# Patient Record
Sex: FEMALE | Race: BLACK OR AFRICAN AMERICAN | NOT HISPANIC OR LATINO | Employment: FULL TIME | URBAN - METROPOLITAN AREA
[De-identification: names, ages, dates, MRNs, and addresses within clinical notes are randomized per-mention and may not be internally consistent; named-entity substitution may affect disease eponyms.]

---

## 2020-07-10 ENCOUNTER — HOSPITAL ENCOUNTER (INPATIENT)
Facility: HOSPITAL | Age: 55
LOS: 6 days | Discharge: NURSING, REHAB OR LONG-TERM CARE | DRG: 871 | End: 2020-07-16
Attending: EMERGENCY MEDICINE | Admitting: INTERNAL MEDICINE
Payer: COMMERCIAL

## 2020-07-10 DIAGNOSIS — J96.01 ACUTE RESPIRATORY FAILURE WITH HYPOXIA: ICD-10-CM

## 2020-07-10 DIAGNOSIS — R09.02 HYPOXIA: ICD-10-CM

## 2020-07-10 DIAGNOSIS — U07.1 COVID-19 VIRUS INFECTION: Primary | ICD-10-CM

## 2020-07-10 DIAGNOSIS — R06.02 SOB (SHORTNESS OF BREATH): ICD-10-CM

## 2020-07-10 DIAGNOSIS — R07.9 CHEST PAIN: ICD-10-CM

## 2020-07-10 PROBLEM — R79.89 ELEVATED TROPONIN: Status: ACTIVE | Noted: 2020-07-10

## 2020-07-10 PROBLEM — E66.01 MORBID OBESITY: Status: ACTIVE | Noted: 2020-07-10

## 2020-07-10 PROBLEM — I10 HTN (HYPERTENSION): Status: ACTIVE | Noted: 2020-07-10

## 2020-07-10 PROBLEM — A41.9 SEPSIS: Status: ACTIVE | Noted: 2020-07-10

## 2020-07-10 PROBLEM — N17.9 AKI (ACUTE KIDNEY INJURY): Status: ACTIVE | Noted: 2020-07-10

## 2020-07-10 PROBLEM — K21.9 GERD (GASTROESOPHAGEAL REFLUX DISEASE): Status: ACTIVE | Noted: 2020-07-10

## 2020-07-10 PROBLEM — E03.9 HYPOTHYROIDISM: Status: ACTIVE | Noted: 2020-07-10

## 2020-07-10 PROBLEM — J18.9 BILATERAL PNEUMONIA: Status: ACTIVE | Noted: 2020-07-10

## 2020-07-10 LAB
ALBUMIN SERPL BCP-MCNC: 3.9 G/DL (ref 3.5–5.2)
ALLENS TEST: ABNORMAL
ALP SERPL-CCNC: 89 U/L (ref 55–135)
ALT SERPL W/O P-5'-P-CCNC: 44 U/L (ref 10–44)
ANION GAP SERPL CALC-SCNC: 14 MMOL/L (ref 8–16)
AST SERPL-CCNC: 43 U/L (ref 10–40)
BACTERIA #/AREA URNS HPF: ABNORMAL /HPF
BASOPHILS # BLD AUTO: 0.01 K/UL (ref 0–0.2)
BASOPHILS # BLD AUTO: 0.01 K/UL (ref 0–0.2)
BASOPHILS NFR BLD: 0.2 % (ref 0–1.9)
BASOPHILS NFR BLD: 0.3 % (ref 0–1.9)
BILIRUB SERPL-MCNC: 0.4 MG/DL (ref 0.1–1)
BILIRUB UR QL STRIP: ABNORMAL
BNP SERPL-MCNC: <10 PG/ML (ref 0–99)
BUN SERPL-MCNC: 24 MG/DL (ref 6–20)
CALCIUM SERPL-MCNC: 9.1 MG/DL (ref 8.7–10.5)
CHLORIDE SERPL-SCNC: 104 MMOL/L (ref 95–110)
CK SERPL-CCNC: 638 U/L (ref 20–180)
CLARITY UR: CLEAR
CO2 SERPL-SCNC: 19 MMOL/L (ref 23–29)
COLOR UR: YELLOW
CREAT SERPL-MCNC: 2.2 MG/DL (ref 0.5–1.4)
D DIMER PPP IA.FEU-MCNC: 1.35 MG/L FEU
DELSYS: ABNORMAL
DIFFERENTIAL METHOD: ABNORMAL
DIFFERENTIAL METHOD: ABNORMAL
EOSINOPHIL # BLD AUTO: 0 K/UL (ref 0–0.5)
EOSINOPHIL # BLD AUTO: 0 K/UL (ref 0–0.5)
EOSINOPHIL NFR BLD: 0 % (ref 0–8)
EOSINOPHIL NFR BLD: 0 % (ref 0–8)
ERYTHROCYTE [DISTWIDTH] IN BLOOD BY AUTOMATED COUNT: 14.2 % (ref 11.5–14.5)
ERYTHROCYTE [DISTWIDTH] IN BLOOD BY AUTOMATED COUNT: 14.4 % (ref 11.5–14.5)
EST. GFR  (AFRICAN AMERICAN): 28 ML/MIN/1.73 M^2
EST. GFR  (NON AFRICAN AMERICAN): 24 ML/MIN/1.73 M^2
FIO2: 21
GLUCOSE SERPL-MCNC: 109 MG/DL (ref 70–110)
GLUCOSE UR QL STRIP: NEGATIVE
HCO3 UR-SCNC: 14.6 MMOL/L (ref 24–28)
HCT VFR BLD AUTO: 45.6 % (ref 37–48.5)
HCT VFR BLD AUTO: 46.2 % (ref 37–48.5)
HGB BLD-MCNC: 14.9 G/DL (ref 12–16)
HGB BLD-MCNC: 15.1 G/DL (ref 12–16)
HGB UR QL STRIP: ABNORMAL
HYALINE CASTS #/AREA URNS LPF: 0 /LPF
IMM GRANULOCYTES # BLD AUTO: 0.01 K/UL (ref 0–0.04)
IMM GRANULOCYTES # BLD AUTO: 0.01 K/UL (ref 0–0.04)
IMM GRANULOCYTES NFR BLD AUTO: 0.2 % (ref 0–0.5)
IMM GRANULOCYTES NFR BLD AUTO: 0.3 % (ref 0–0.5)
KETONES UR QL STRIP: ABNORMAL
LACTATE SERPL-SCNC: 1.8 MMOL/L (ref 0.5–2.2)
LEUKOCYTE ESTERASE UR QL STRIP: NEGATIVE
LYMPHOCYTES # BLD AUTO: 1 K/UL (ref 1–4.8)
LYMPHOCYTES # BLD AUTO: 1.5 K/UL (ref 1–4.8)
LYMPHOCYTES NFR BLD: 27.6 % (ref 18–48)
LYMPHOCYTES NFR BLD: 31.9 % (ref 18–48)
MCH RBC QN AUTO: 27.3 PG (ref 27–31)
MCH RBC QN AUTO: 27.4 PG (ref 27–31)
MCHC RBC AUTO-ENTMCNC: 32.7 G/DL (ref 32–36)
MCHC RBC AUTO-ENTMCNC: 32.7 G/DL (ref 32–36)
MCV RBC AUTO: 83 FL (ref 82–98)
MCV RBC AUTO: 84 FL (ref 82–98)
MICROSCOPIC COMMENT: ABNORMAL
MODE: ABNORMAL
MONOCYTES # BLD AUTO: 0.2 K/UL (ref 0.3–1)
MONOCYTES # BLD AUTO: 0.3 K/UL (ref 0.3–1)
MONOCYTES NFR BLD: 6.1 % (ref 4–15)
MONOCYTES NFR BLD: 6.7 % (ref 4–15)
NEUTROPHILS # BLD AUTO: 2.3 K/UL (ref 1.8–7.7)
NEUTROPHILS # BLD AUTO: 2.8 K/UL (ref 1.8–7.7)
NEUTROPHILS NFR BLD: 61.6 % (ref 38–73)
NEUTROPHILS NFR BLD: 65.1 % (ref 38–73)
NITRITE UR QL STRIP: NEGATIVE
NRBC BLD-RTO: 0 /100 WBC
NRBC BLD-RTO: 0 /100 WBC
PCO2 BLDA: 21.2 MMHG (ref 35–45)
PH SMN: 7.45 [PH] (ref 7.35–7.45)
PH UR STRIP: 6 [PH] (ref 5–8)
PLATELET # BLD AUTO: 194 K/UL (ref 150–350)
PLATELET # BLD AUTO: 213 K/UL (ref 150–350)
PMV BLD AUTO: 11.6 FL (ref 9.2–12.9)
PMV BLD AUTO: 12.1 FL (ref 9.2–12.9)
PO2 BLDA: 58 MMHG (ref 80–100)
POC BE: -9 MMOL/L
POC SATURATED O2: 92 % (ref 95–100)
POTASSIUM SERPL-SCNC: 3.6 MMOL/L (ref 3.5–5.1)
PROT SERPL-MCNC: 7.6 G/DL (ref 6–8.4)
PROT UR QL STRIP: ABNORMAL
RBC # BLD AUTO: 5.44 M/UL (ref 4–5.4)
RBC # BLD AUTO: 5.54 M/UL (ref 4–5.4)
RBC #/AREA URNS HPF: 0 /HPF (ref 0–4)
SAMPLE: ABNORMAL
SARS-COV-2 RDRP RESP QL NAA+PROBE: NEGATIVE
SITE: ABNORMAL
SODIUM SERPL-SCNC: 137 MMOL/L (ref 136–145)
SP GR UR STRIP: 1.02 (ref 1–1.03)
SQUAMOUS #/AREA URNS HPF: 3 /HPF
TROPONIN I SERPL DL<=0.01 NG/ML-MCNC: 0.03 NG/ML (ref 0–0.03)
TROPONIN I SERPL DL<=0.01 NG/ML-MCNC: 0.03 NG/ML (ref 0–0.03)
URN SPEC COLLECT METH UR: ABNORMAL
UROBILINOGEN UR STRIP-ACNC: NEGATIVE EU/DL
WBC # BLD AUTO: 3.59 K/UL (ref 3.9–12.7)
WBC # BLD AUTO: 4.58 K/UL (ref 3.9–12.7)
WBC #/AREA URNS HPF: 3 /HPF (ref 0–5)

## 2020-07-10 PROCEDURE — 83605 ASSAY OF LACTIC ACID: CPT

## 2020-07-10 PROCEDURE — U0002 COVID-19 LAB TEST NON-CDC: HCPCS

## 2020-07-10 PROCEDURE — 85379 FIBRIN DEGRADATION QUANT: CPT | Mod: ER

## 2020-07-10 PROCEDURE — 93010 ELECTROCARDIOGRAM REPORT: CPT | Mod: 76,,, | Performed by: INTERNAL MEDICINE

## 2020-07-10 PROCEDURE — 27000221 HC OXYGEN, UP TO 24 HOURS

## 2020-07-10 PROCEDURE — 63600175 PHARM REV CODE 636 W HCPCS: Performed by: NURSE PRACTITIONER

## 2020-07-10 PROCEDURE — 84484 ASSAY OF TROPONIN QUANT: CPT

## 2020-07-10 PROCEDURE — 36600 WITHDRAWAL OF ARTERIAL BLOOD: CPT

## 2020-07-10 PROCEDURE — 82803 BLOOD GASES ANY COMBINATION: CPT

## 2020-07-10 PROCEDURE — 99291 CRITICAL CARE FIRST HOUR: CPT | Mod: 25

## 2020-07-10 PROCEDURE — 94640 AIRWAY INHALATION TREATMENT: CPT

## 2020-07-10 PROCEDURE — 25000003 PHARM REV CODE 250: Performed by: NURSE PRACTITIONER

## 2020-07-10 PROCEDURE — 99900035 HC TECH TIME PER 15 MIN (STAT)

## 2020-07-10 PROCEDURE — 83880 ASSAY OF NATRIURETIC PEPTIDE: CPT

## 2020-07-10 PROCEDURE — 93010 ELECTROCARDIOGRAM REPORT: CPT | Mod: ,,, | Performed by: INTERNAL MEDICINE

## 2020-07-10 PROCEDURE — 96375 TX/PRO/DX INJ NEW DRUG ADDON: CPT

## 2020-07-10 PROCEDURE — 81000 URINALYSIS NONAUTO W/SCOPE: CPT

## 2020-07-10 PROCEDURE — 36415 COLL VENOUS BLD VENIPUNCTURE: CPT

## 2020-07-10 PROCEDURE — 93010 EKG 12-LEAD: ICD-10-PCS | Mod: 76,,, | Performed by: INTERNAL MEDICINE

## 2020-07-10 PROCEDURE — 94761 N-INVAS EAR/PLS OXIMETRY MLT: CPT

## 2020-07-10 PROCEDURE — 85025 COMPLETE CBC W/AUTO DIFF WBC: CPT | Mod: 91

## 2020-07-10 PROCEDURE — 63600175 PHARM REV CODE 636 W HCPCS: Performed by: EMERGENCY MEDICINE

## 2020-07-10 PROCEDURE — 82550 ASSAY OF CK (CPK): CPT

## 2020-07-10 PROCEDURE — 93005 ELECTROCARDIOGRAM TRACING: CPT

## 2020-07-10 PROCEDURE — 96365 THER/PROPH/DIAG IV INF INIT: CPT

## 2020-07-10 PROCEDURE — 21400001 HC TELEMETRY ROOM

## 2020-07-10 PROCEDURE — 84484 ASSAY OF TROPONIN QUANT: CPT | Mod: 91

## 2020-07-10 PROCEDURE — 25000003 PHARM REV CODE 250: Performed by: EMERGENCY MEDICINE

## 2020-07-10 PROCEDURE — 80053 COMPREHEN METABOLIC PANEL: CPT

## 2020-07-10 PROCEDURE — 87040 BLOOD CULTURE FOR BACTERIA: CPT | Mod: 59

## 2020-07-10 PROCEDURE — 25000242 PHARM REV CODE 250 ALT 637 W/ HCPCS: Performed by: NURSE PRACTITIONER

## 2020-07-10 RX ORDER — IBUPROFEN 200 MG
24 TABLET ORAL
Status: DISCONTINUED | OUTPATIENT
Start: 2020-07-10 | End: 2020-07-16 | Stop reason: HOSPADM

## 2020-07-10 RX ORDER — PANTOPRAZOLE SODIUM 40 MG/1
40 TABLET, DELAYED RELEASE ORAL DAILY
Status: DISCONTINUED | OUTPATIENT
Start: 2020-07-11 | End: 2020-07-14

## 2020-07-10 RX ORDER — ACETAMINOPHEN 325 MG/1
650 TABLET ORAL
Status: COMPLETED | OUTPATIENT
Start: 2020-07-10 | End: 2020-07-10

## 2020-07-10 RX ORDER — LEVOTHYROXINE SODIUM 75 UG/1
TABLET ORAL
COMMUNITY
Start: 2020-04-06

## 2020-07-10 RX ORDER — ALBUTEROL SULFATE 90 UG/1
2 AEROSOL, METERED RESPIRATORY (INHALATION) EVERY 8 HOURS
Status: DISCONTINUED | OUTPATIENT
Start: 2020-07-10 | End: 2020-07-10

## 2020-07-10 RX ORDER — ALBUTEROL SULFATE 90 UG/1
2 AEROSOL, METERED RESPIRATORY (INHALATION) EVERY 8 HOURS
Status: DISCONTINUED | OUTPATIENT
Start: 2020-07-10 | End: 2020-07-16 | Stop reason: HOSPADM

## 2020-07-10 RX ORDER — LEVOTHYROXINE SODIUM 75 UG/1
75 TABLET ORAL
Status: DISCONTINUED | OUTPATIENT
Start: 2020-07-11 | End: 2020-07-16 | Stop reason: HOSPADM

## 2020-07-10 RX ORDER — SODIUM CHLORIDE 0.9 % (FLUSH) 0.9 %
10 SYRINGE (ML) INJECTION
Status: DISCONTINUED | OUTPATIENT
Start: 2020-07-10 | End: 2020-07-16 | Stop reason: HOSPADM

## 2020-07-10 RX ORDER — GLUCAGON 1 MG
1 KIT INJECTION
Status: DISCONTINUED | OUTPATIENT
Start: 2020-07-10 | End: 2020-07-16 | Stop reason: HOSPADM

## 2020-07-10 RX ORDER — HEPARIN SODIUM 5000 [USP'U]/ML
5000 INJECTION, SOLUTION INTRAVENOUS; SUBCUTANEOUS EVERY 8 HOURS
Status: DISCONTINUED | OUTPATIENT
Start: 2020-07-10 | End: 2020-07-14

## 2020-07-10 RX ORDER — PROMETHAZINE HYDROCHLORIDE AND CODEINE PHOSPHATE 6.25; 1 MG/5ML; MG/5ML
5 SOLUTION ORAL EVERY 4 HOURS PRN
Status: DISCONTINUED | OUTPATIENT
Start: 2020-07-10 | End: 2020-07-16 | Stop reason: HOSPADM

## 2020-07-10 RX ORDER — PANTOPRAZOLE SODIUM 40 MG/1
40 TABLET, DELAYED RELEASE ORAL DAILY
COMMUNITY

## 2020-07-10 RX ORDER — NITROGLYCERIN 0.4 MG/1
0.4 TABLET SUBLINGUAL EVERY 5 MIN PRN
Status: DISCONTINUED | OUTPATIENT
Start: 2020-07-10 | End: 2020-07-10

## 2020-07-10 RX ORDER — ASCORBIC ACID 500 MG
TABLET ORAL
COMMUNITY

## 2020-07-10 RX ORDER — POTASSIUM CHLORIDE 20 MEQ/1
TABLET, EXTENDED RELEASE ORAL
COMMUNITY
Start: 2020-05-28

## 2020-07-10 RX ORDER — ACETAMINOPHEN 325 MG/1
650 TABLET ORAL EVERY 4 HOURS PRN
Status: DISCONTINUED | OUTPATIENT
Start: 2020-07-10 | End: 2020-07-16 | Stop reason: HOSPADM

## 2020-07-10 RX ORDER — ASPIRIN 81 MG/1
81 TABLET ORAL DAILY
Status: DISCONTINUED | OUTPATIENT
Start: 2020-07-11 | End: 2020-07-14

## 2020-07-10 RX ORDER — ONDANSETRON 2 MG/ML
4 INJECTION INTRAMUSCULAR; INTRAVENOUS
Status: COMPLETED | OUTPATIENT
Start: 2020-07-10 | End: 2020-07-10

## 2020-07-10 RX ORDER — TRIAMTERENE AND HYDROCHLOROTHIAZIDE 37.5; 25 MG/1; MG/1
CAPSULE ORAL
Status: ON HOLD | COMMUNITY
End: 2020-07-16 | Stop reason: HOSPADM

## 2020-07-10 RX ORDER — ASCORBIC ACID 1000 MG
TABLET, EXTENDED RELEASE ORAL
Status: ON HOLD | COMMUNITY
End: 2020-07-16 | Stop reason: HOSPADM

## 2020-07-10 RX ORDER — SODIUM CHLORIDE 9 MG/ML
INJECTION, SOLUTION INTRAVENOUS CONTINUOUS
Status: DISCONTINUED | OUTPATIENT
Start: 2020-07-10 | End: 2020-07-16 | Stop reason: HOSPADM

## 2020-07-10 RX ORDER — IBUPROFEN 200 MG
16 TABLET ORAL
Status: DISCONTINUED | OUTPATIENT
Start: 2020-07-10 | End: 2020-07-16 | Stop reason: HOSPADM

## 2020-07-10 RX ORDER — AZITHROMYCIN 250 MG/1
250 TABLET, FILM COATED ORAL DAILY
Status: COMPLETED | OUTPATIENT
Start: 2020-07-11 | End: 2020-07-14

## 2020-07-10 RX ORDER — GLUCOSAM/CHONDRO/HERB 149/HYAL 750-100 MG
TABLET ORAL
Status: ON HOLD | COMMUNITY
End: 2020-07-16 | Stop reason: HOSPADM

## 2020-07-10 RX ORDER — BENZONATATE 100 MG/1
100 CAPSULE ORAL 3 TIMES DAILY PRN
Status: DISCONTINUED | OUTPATIENT
Start: 2020-07-10 | End: 2020-07-16 | Stop reason: HOSPADM

## 2020-07-10 RX ORDER — BIOTIN 10 MG
TABLET ORAL
COMMUNITY

## 2020-07-10 RX ORDER — ONDANSETRON 2 MG/ML
4 INJECTION INTRAMUSCULAR; INTRAVENOUS EVERY 6 HOURS PRN
Status: DISCONTINUED | OUTPATIENT
Start: 2020-07-10 | End: 2020-07-16 | Stop reason: HOSPADM

## 2020-07-10 RX ORDER — ASPIRIN 81 MG/1
81 TABLET ORAL DAILY
COMMUNITY

## 2020-07-10 RX ADMIN — AZITHROMYCIN MONOHYDRATE 500 MG: 500 INJECTION, POWDER, LYOPHILIZED, FOR SOLUTION INTRAVENOUS at 09:07

## 2020-07-10 RX ADMIN — HEPARIN SODIUM 5000 UNITS: 5000 INJECTION, SOLUTION INTRAVENOUS; SUBCUTANEOUS at 10:07

## 2020-07-10 RX ADMIN — VANCOMYCIN HYDROCHLORIDE 2000 MG: 100 INJECTION, POWDER, LYOPHILIZED, FOR SOLUTION INTRAVENOUS at 12:07

## 2020-07-10 RX ADMIN — PIPERACILLIN AND TAZOBACTAM 4.5 G: 4; .5 INJECTION, POWDER, LYOPHILIZED, FOR SOLUTION INTRAVENOUS; PARENTERAL at 11:07

## 2020-07-10 RX ADMIN — ALBUTEROL SULFATE 2 PUFF: 90 AEROSOL, METERED RESPIRATORY (INHALATION) at 07:07

## 2020-07-10 RX ADMIN — CEFTRIAXONE 1 G: 1 INJECTION, SOLUTION INTRAVENOUS at 08:07

## 2020-07-10 RX ADMIN — SODIUM CHLORIDE: 0.9 INJECTION, SOLUTION INTRAVENOUS at 04:07

## 2020-07-10 RX ADMIN — PROMETHAZINE HYDROCHLORIDE AND CODEINE PHOSPHATE 5 ML: 6.25; 1 SOLUTION ORAL at 05:07

## 2020-07-10 RX ADMIN — ONDANSETRON HYDROCHLORIDE 4 MG: 2 INJECTION, SOLUTION INTRAMUSCULAR; INTRAVENOUS at 10:07

## 2020-07-10 RX ADMIN — ACETAMINOPHEN 650 MG: 325 TABLET ORAL at 08:07

## 2020-07-10 RX ADMIN — ACETAMINOPHEN 650 MG: 325 TABLET ORAL at 10:07

## 2020-07-10 RX ADMIN — ONDANSETRON 4 MG: 2 INJECTION INTRAMUSCULAR; INTRAVENOUS at 11:07

## 2020-07-10 RX ADMIN — DEXAMETHASONE 6 MG: 4 TABLET ORAL at 04:07

## 2020-07-10 NOTE — ASSESSMENT & PLAN NOTE
- secondary to COVID-19 viral infection in addition to bilateral pneumonia  - ABG showed Po2 of 58 on room air  - treat pneumonia  - continue supplemental oxygen to KS >92%  - Will need home O2 eval prior to DC  - monitor

## 2020-07-10 NOTE — ASSESSMENT & PLAN NOTE
- BC drawn and are pending  - will attempt to obtain sputum culture  - Given Vanc and Zosyn in the ER, will transition to Azithromycin and Rocephin per protocol  - Albuterol inhaler PRN  - Supplemental O2 to KS >92  - Monitor

## 2020-07-10 NOTE — ED NOTES
Pt reports generalized weakness, nausea, body aches, chills and dry cough. Pt AAOx4, respirations labored after coughing spell, SPO2 99% on 3L/min, placed pt in upright position, will continue to monitor.

## 2020-07-10 NOTE — HPI
Barbara Weir is a 55 y.o. AAF with a PMHx of Morbid obesity, Anemia, and HTN, who presented to the Emergency Department today for evaluation of worsening SOB, which onset a few days ago.  Symptoms are constant and moderate in severity.  Of note, patient reportedly tested positive for COVID-19 on 7/7/20 (test obtained at [facility]), but repeat rapid test today noted to be negative.  Patient's  also tested positive and was admitted today.  No mitigating or exacerbating factors reported.  Associated sxs include SOB, myalgias, cough, nausea, subjective fever, chills, and fatigue. Patient denies any CP, vomiting, diarrhea, dysuria, HA, and all other sxs at this time.  No prior Tx reported.  No further complaints or concerns at this time.  ER workup showed; tachycardia and tachypnea.  SaO2 on nasal cannula 93%.  CBC showed WBCs of 3.59, otherwise unremarkable.  CMP showed creatinine 2.2, CO2 19, AST 43, otherwise unremarkable.  BNP less than 10.  .  Lactic acid 1.8.  ABG showed; ph 7.447, Pco2 21.2, HCO3 14.6, Po2 58 on RA.  CXR showed a moderate amount of alveolar consolidation scattered throughout the lower 2/3 of both lungs.  This is consistent with the patient's history and characteristic of pneumonia.  The size of the heart is prominent.  Some of this may be secondary to magnification.  BC were drawn and patient was given Vanc and Zosyn in the ER.  Hospital Medicine contacted for admission.  Patient will be placed in observation.  She is a full code.  Her SDM is her daughter Charley who can be reached at [phone].

## 2020-07-10 NOTE — ED TRIAGE NOTES
Arrives to ER complaining of SOB, generalized body aches and pain, reports testing positive for coivd, continuous cough, chest pain when coughing, nausea -v/d also complaining of left shoulder pain. Currently 93% on RA

## 2020-07-10 NOTE — ASSESSMENT & PLAN NOTE
- BP well controlled  - Hold home losartan and HCTZ given ASHLEY  - Resume home Amlodipine pending blood pressure trends

## 2020-07-10 NOTE — ASSESSMENT & PLAN NOTE
- TSH pending  - continue home Synthroid and adjust dose if need be  - outpatient follow-up with PCP

## 2020-07-10 NOTE — ASSESSMENT & PLAN NOTE
- Tachycardic and tachypneic  - Lactic acid 1.8  - Procalcitonin pending  - Likely secondary to COVID-19 viral infection in addition to bilateral pneumonia  - BC drawn and are pending  - Will hold off on IV fluid bolus for now as patient is hemodynamically stable  - Continue IV ABX  - Monitor

## 2020-07-10 NOTE — H&P
[facility] Hospital Medicine  History & Physical    Patient Name: Barbara Weir  MRN: [id number]  Admission Date: 7/10/2020  Attending Physician: No att. providers found   Primary Care Provider: Daniella Samson MD         Patient information was obtained from patient, relative(s) and ER records.     Subjective:     Principal Problem:COVID-19 virus infection    Chief Complaint:   Chief Complaint   Patient presents with    Shortness of Breath     COVID positive        HPI: Barbara Weir is a 55 y.o. AAF with a PMHx of Morbid obesity, Anemia, and HTN, who presented to the Emergency Department today for evaluation of worsening SOB, which onset a few days ago.  Symptoms are constant and moderate in severity.  Of note, patient reportedly tested positive for COVID-19 on 7/7/20 (test obtained at [facility]), but repeat rapid test today noted to be negative.  Patient's  also tested positive and was admitted today.  No mitigating or exacerbating factors reported.  Associated sxs include SOB, myalgias, cough, nausea, subjective fever, chills, and fatigue. Patient denies any CP, vomiting, diarrhea, dysuria, HA, and all other sxs at this time.  No prior Tx reported.  No further complaints or concerns at this time.  ER workup showed; tachycardia and tachypnea.  SaO2 on nasal cannula 93%.  CBC showed WBCs of 3.59, otherwise unremarkable.  CMP showed creatinine 2.2, CO2 19, AST 43, otherwise unremarkable.  BNP less than 10.  .  Lactic acid 1.8.  ABG showed; ph 7.447, Pco2 21.2, HCO3 14.6, Po2 58 on RA.  CXR showed a moderate amount of alveolar consolidation scattered throughout the lower 2/3 of both lungs.  This is consistent with the patient's history and characteristic of pneumonia.  The size of the heart is prominent.  Some of this may be secondary to magnification.  BC were drawn and patient was given Vanc and Zosyn in the ER.  Hospital Medicine contacted for admission.  Patient will be  placed in observation.  She is a full code.  Her SDM is her daughter Charley who can be reached at [phone].    Past Medical History:   Diagnosis Date    Anemia     Hypertension        Past Surgical History:   Procedure Laterality Date    CHOLECYSTECTOMY         Review of patient's allergies indicates:  No Known Allergies    No current facility-administered medications on file prior to encounter.      Current Outpatient Medications on File Prior to Encounter   Medication Sig    amlodipine (NORVASC) 5 MG tablet Take 5 mg by mouth once daily.    aspirin (ECOTRIN) 81 MG EC tablet Take 81 mg by mouth once daily.    biotin 10 mg Tab 1 tablet    Ca carb-Ca gluc-Mg ox-Mg gluco (CALCIUM MAGNESIUM) 500 mg calcium -250 mg Tab 1 tablet with a meal    ferrous gluconate (FERGON) 240 (27 FE) MG tablet Take 480 mg by mouth 3 (three) times daily.    levothyroxine (SYNTHROID) 75 MCG tablet     losartan (COZAAR) 100 MG tablet Take 100 mg by mouth once daily.    melatonin 300 mcg Tab 1 tablet at bedtime as needed with food    naproxen (NAPROSYN) 375 MG tablet Take 1 tablet (375 mg total) by mouth 2 (two) times daily with meals.    omega 3-dha-epa-fish oil 1,000 mg (120 mg-180 mg) Cap 1 capsule    pantoprazole (PROTONIX) 40 MG tablet Take 40 mg by mouth once daily.     potassium chloride SA (K-DUR,KLOR-CON) 20 MEQ tablet     triamterene-hydrochlorothiazide 37.5-25 mg (DYAZIDE) 37.5-25 mg per capsule TAKE 1 CAPSULE EVERY MORNING    [DISCONTINUED] furosemide (LASIX) 20 MG tablet Take 20 mg by mouth 2 (two) times daily.     Family History     None        Tobacco Use    Smoking status: Never Smoker   Substance and Sexual Activity    Alcohol use: No    Drug use: No    Sexual activity: Not on file     Review of Systems   Constitutional: Positive for chills, fatigue and fever. Negative for diaphoresis.   HENT: Negative for facial swelling, hearing loss, mouth sores, sneezing, sore throat, tinnitus and trouble  swallowing.    Eyes: Negative for photophobia, pain, discharge, redness and visual disturbance.   Respiratory: Positive for cough and shortness of breath. Negative for apnea, choking, chest tightness, wheezing and stridor.         C/o dry, non-productive cough.   Cardiovascular: Negative for chest pain, palpitations and leg swelling.   Gastrointestinal: Positive for diarrhea and nausea. Negative for abdominal distention, abdominal pain, anal bleeding, blood in stool, constipation, rectal pain and vomiting.   Endocrine: Negative for cold intolerance, heat intolerance, polydipsia, polyphagia and polyuria.   Genitourinary: Negative for difficulty urinating, dysuria, flank pain, frequency, hematuria, pelvic pain, urgency, vaginal bleeding, vaginal discharge and vaginal pain.   Musculoskeletal: Positive for myalgias. Negative for arthralgias, back pain, gait problem and neck stiffness.   Skin: Negative for pallor, rash and wound.   Allergic/Immunologic: Negative for food allergies.   Neurological: Negative for dizziness, tremors, seizures, syncope, speech difficulty, weakness and headaches.   Hematological: Negative for adenopathy. Does not bruise/bleed easily.   Psychiatric/Behavioral: Negative for behavioral problems and confusion. The patient is not nervous/anxious.    All other systems reviewed and are negative.    Objective:     Vital Signs (Most Recent):  Temp: 99.8 °F (37.7 °C) (07/10/20 0944)  Pulse: 108 (07/10/20 1550)  Resp: (!) 27 (07/10/20 1550)  BP: 128/62 (07/10/20 1550)  SpO2: 96 % (07/10/20 1550) Vital Signs (24h Range):  Temp:  [99.8 °F (37.7 °C)] 99.8 °F (37.7 °C)  Pulse:  [] 108  Resp:  [10-33] 27  SpO2:  [93 %-98 %] 96 %  BP: (102-128)/(59-73) 128/62        There is no height or weight on file to calculate BMI.    Physical Exam  Vitals signs and nursing note reviewed.   Constitutional:       Appearance: She is well-developed. She is obese. She is ill-appearing.      Comments: Morbidly obese, AAF  resting comfortably in bed in no acute distress.  Daughter Charley at bedside.   HENT:      Head: Normocephalic and atraumatic.      Nose: Nose normal.   Eyes:      Conjunctiva/sclera: Conjunctivae normal.      Pupils: Pupils are equal, round, and reactive to light.   Neck:      Musculoskeletal: Normal range of motion and neck supple.   Cardiovascular:      Rate and Rhythm: Regular rhythm. Tachycardia present.      Heart sounds: Normal heart sounds. No murmur. No friction rub. No gallop.    Pulmonary:      Effort: Pulmonary effort is normal. Tachypnea present. No accessory muscle usage or respiratory distress.      Breath sounds: Normal breath sounds. Decreased air movement present. No wheezing or rales.      Comments: Coarse throughout.  Mild tachypnea.  Comfortable on 3 L nasal cannula and in no overt respiratory distress at this time. Frequent, dry, non-productive cough.  Chest:      Chest wall: No tenderness.   Abdominal:      General: Bowel sounds are normal. There is no distension.      Palpations: Abdomen is soft. There is no mass.      Tenderness: There is no abdominal tenderness.   Musculoskeletal: Normal range of motion.         General: No tenderness.   Skin:     General: Skin is warm and dry.      Findings: No erythema or rash.   Neurological:      Mental Status: She is alert and oriented to person, place, and time.   Psychiatric:         Behavior: Behavior normal.         Thought Content: Thought content normal.         Judgment: Judgment normal.           CRANIAL NERVES     CN III, IV, VI   Pupils are equal, round, and reactive to light.       Significant Labs:   ABGs:   Recent Labs   Lab 07/10/20  0931   PH 7.447   PCO2 21.2*   HCO3 14.6*   POCSATURATED 92*   BE -9     CBC:   Recent Labs   Lab 07/10/20  0937 07/10/20  1545   WBC 3.59* 4.58   HGB 15.1 14.9   HCT 46.2 45.6    213     CMP:   Recent Labs   Lab 07/10/20  0937      K 3.6      CO2 19*      BUN 24*   CREATININE 2.2*    CALCIUM 9.1   PROT 7.6   ALBUMIN 3.9   BILITOT 0.4   ALKPHOS 89   AST 43*   ALT 44   ANIONGAP 14   EGFRNONAA 24*     Lactic Acid:   Recent Labs   Lab 07/10/20  1110   LACTATE 1.8     Troponin:   Recent Labs   Lab 07/10/20  0937   TROPONINI 0.031*     Urine Studies:   Recent Labs   Lab 07/10/20  1318   COLORU Yellow   APPEARANCEUA Clear   PHUR 6.0   SPECGRAV 1.025   PROTEINUA 1+*   GLUCUA Negative   KETONESU Trace*   BILIRUBINUA 1+*   OCCULTUA Trace*   NITRITE Negative   UROBILINOGEN Negative   LEUKOCYTESUR Negative   RBCUA 0   WBCUA 3   BACTERIA Few*   SQUAMEPITHEL 3   HYALINECASTS 0       Significant Imaging: I have reviewed all pertinent imaging results/findings within the past 24 hours.    Assessment/Plan:     * COVID-19 virus infection   - COVID-19 testing performed on 7/7 at [facility] and reported as POSITIVE; rapid today is negative, however, clinically, patient presents with Covid  - Infection Control notified     - Placed in OBS  - Remdesivir fact she given to patient to review, and at this time she is refusing treatment.  Will revisit if patient changes her mind, or if she becomes more symptomatic.  - Will start on Dexamethasone 6 mg po daily  - BC drawn and are pending  - continue empiric antibiotics  - Inflammatory markers, including D dimer pending      - Isolation:   - Airborne, Contact and Droplet Precautions  - Cohort patients into COVID units  - N95 mask, wear eye protection  - 20 second hand hygiene              - Limit visitors per hospital policy              - Consolidating lab draws, nursing care, provider visits, and interventions    - Diagnostics: (leukopenia, hyponatremia, hyperferritinemia, elevated troponin, elevated d-dimer, age, and comorbidities are significant predictors of poor clinical outcome)  CBC, CMP, Procalcitonin, Ferritin, CRP, LDH, Troponin, ECG, Portable CXR and UA and culture    - Management:  Supplemental O2 to maintain SpO2 >92%  Telemetry  Continuous/intermittent Pulse  Ox  Albuterol treatment PRN    Advance Care Planning   Full Code             Bilateral pneumonia  - BC drawn and are pending  - will attempt to obtain sputum culture  - Given Vanc and Zosyn in the ER, will transition to Azithromycin and Rocephin per protocol  - Albuterol inhaler PRN  - Supplemental O2 to KS >92  - Monitor       Acute hypoxemic respiratory failure  - secondary to COVID-19 viral infection in addition to bilateral pneumonia  - ABG showed Po2 of 58 on room air  - treat pneumonia  - continue supplemental oxygen to [place] >92%  - Will need home O2 eval prior to DC  - monitor      Sepsis  - Tachycardic and tachypneic  - Lactic acid 1.8  - Procalcitonin pending  - Likely secondary to COVID-19 viral infection in addition to bilateral pneumonia  - BC drawn and are pending  - Will hold off on IV fluid bolus for now as patient is hemodynamically stable  - Continue IV ABX  - Monitor      ASHLEY (acute kidney injury)  - Cr elevated to 2.2 (last known Cr was 1.1 in 2004)  - Hold home losartan and HCTZ for now  - Gentle IVFs  - Caution with nephrotoxic medications  - Monitor with BMP      Elevated troponin  - 12 lead EKG showed sinus tachycardia with occasional Premature ventricular complexes  - Currently denies chest pain and/or equivalent  - Initial troponin 0.031, will trend  - Tele monitoring      GERD (gastroesophageal reflux disease)  - Continue Protonix      Hypothyroidism  - TSH pending  - continue home Synthroid and adjust dose if need be  - outpatient follow-up with PCP      Morbid obesity  -  on self-directed weight loss and need for increased physical activity      HTN (hypertension)  - BP well controlled  - Hold home losartan and HCTZ given ASHLEY  - Resume home Amlodipine pending blood pressure trends      VTE Risk Mitigation (From admission, onward)         Ordered     heparin (porcine) injection 5,000 Units  Every 8 hours      07/10/20 1453     IP VTE HIGH RISK PATIENT  Once      07/10/20 1453      Place sequential compression device  Until discontinued      07/10/20 7999                   Lety Shell, JOE, ACNP-BC  Department of Hospital Medicine   [facility]

## 2020-07-10 NOTE — ASSESSMENT & PLAN NOTE
- Cr elevated to 2.2 (last known Cr was 1.1 in 2004)  - Hold home losartan and HCTZ for now  - Gentle IVFs  - Caution with nephrotoxic medications  - Monitor with BMP

## 2020-07-10 NOTE — ASSESSMENT & PLAN NOTE
- 12 lead EKG showed sinus tachycardia with occasional Premature ventricular complexes  - Currently denies chest pain and/or equivalent  - Initial troponin 0.031, will trend  - Tele monitoring

## 2020-07-10 NOTE — ED PROVIDER NOTES
SCRIBE #1 NOTE: I, Beatrice Flores, am scribing for, and in the presence of, Ab Trinidad MD. I have scribed the entire note.       History     Chief Complaint   Patient presents with    Shortness of Breath     COVID positive     Review of patient's allergies indicates:  No Known Allergies      History of Present Illness     HPI    7/10/2020, 9:05 AM  History obtained from the patient      History of Present Illness: Barbara Weir is a 55 y.o. female patient with a h/o anemia, HTN, who presents to the Emergency Department for evaluation of SOB which onset a few days ago. Symptoms are constant and moderate in severity. Pt tested positive for COVID-19 on 7/7/20. Pt's  also tested positive. No mitigating or exacerbating factors reported. Associated sxs include SOB, myalgias, cough, and fatigue. Patient denies any CP, n/v/d, dysuria, HA, and all other sxs at this time. No prior Tx reported. No further complaints or concerns at this time.       Arrival mode: EMS    PCP: Daniella Samson MD      Past Medical History:  Past Medical History:   Diagnosis Date    Anemia     Hypertension        Past Surgical History:  Past Surgical History:   Procedure Laterality Date    CHOLECYSTECTOMY           Family History:  History reviewed. No pertinent family history.      Social History:   Social History     Tobacco Use    Smoking status: Never Smoker   Substance and Sexual Activity    Alcohol use: No    Drug use: No    Sexual activity: Unknown        Review of Systems     Review of Systems   Constitutional: Positive for fatigue. Negative for fever.   HENT: Negative for sore throat.    Respiratory: Positive for cough and shortness of breath.    Cardiovascular: Negative for chest pain.   Gastrointestinal: Negative for diarrhea, nausea and vomiting.   Genitourinary: Negative for dysuria.   Musculoskeletal: Positive for myalgias. Negative for back pain.   Skin: Negative for rash.   Neurological: Negative  for headaches.   Hematological: Does not bruise/bleed easily.   All other systems reviewed and are negative.       Physical Exam     Initial Vitals   BP Pulse Resp Temp SpO2   07/10/20 0918 07/10/20 0908 07/10/20 0908 07/10/20 0944 07/10/20 0908   111/67 (!) 130 20 99.8 °F (37.7 °C) 97 %      MAP       --                 Physical Exam  Nursing Notes and Vital Signs Reviewed.  Constitutional: Well-developed and well-nourished.   Head: Atraumatic. Normocephalic.  Eyes: EOM intact. No scleral icterus.  ENT: Mucous membranes are moist. Oropharynx is clear and symmetric.    Neck: Supple. Full ROM. No lymphadenopathy.  Cardiovascular: Tachycardic. Regular rhythm. No murmurs, rubs, or gallops. Distal pulses are 2+ and symmetric.  Pulmonary/Chest: Mild respiratory distress. No wheezing or rales.  Abdominal: Soft and non-distended.  There is no tenderness.  No rebound, guarding, or rigidity. Good bowel sounds.  Genitourinary: No CVA tenderness  Musculoskeletal: Moves all extremities. No obvious deformities. No calf tenderness.  Skin: Warm and dry.  Neurological:  Alert, awake, and appropriate.  Normal speech.  No acute focal neurological deficits are appreciated.  Psychiatric: Normal affect. Good eye contact. Appropriate in content.     ED Course   Critical Care    Date/Time: 7/10/2020 10:55 AM  Performed by: Ab Trinidad MD  Authorized by: Ab Trinidad MD   Direct patient critical care time: 19 minutes  Additional history critical care time: 7 minutes  Ordering / reviewing critical care time: 18 minutes  Documentation critical care time: 16 minutes  Total critical care time (exclusive of procedural time) : 60 minutes  Critical care time was exclusive of separately billable procedures and treating other patients and teaching time.  Critical care was necessary to treat or prevent imminent or life-threatening deterioration of the following conditions: COVID-19 infection, hypoxia, acute respiratory failure with  hypoxia.  Critical care was time spent personally by me on the following activities: blood draw for specimens, development of treatment plan with patient or surrogate, interpretation of cardiac output measurements, evaluation of patient's response to treatment, examination of patient, obtaining history from patient or surrogate, ordering and performing treatments and interventions, ordering and review of laboratory studies, ordering and review of radiographic studies, pulse oximetry, re-evaluation of patient's condition and review of old charts.        ED Vital Signs:  Vitals:    07/10/20 0908 07/10/20 0918 07/10/20 0920 07/10/20 0934   BP:  111/67  113/73   Pulse: (!) 130 107 104 104   Resp: 20 10  (!) 25   Temp:       TempSrc:       SpO2: 97% 95%  97%    07/10/20 0944 07/10/20 1002   BP:  103/71   Pulse:  104   Resp:  (!) 33   Temp: 99.8 °F (37.7 °C)    TempSrc: Oral    SpO2:  98%       Abnormal Lab Results:  Labs Reviewed   CBC W/ AUTO DIFFERENTIAL - Abnormal; Notable for the following components:       Result Value    WBC 3.59 (*)     RBC 5.54 (*)     Mono # 0.2 (*)     All other components within normal limits   COMPREHENSIVE METABOLIC PANEL - Abnormal; Notable for the following components:    CO2 19 (*)     BUN, Bld 24 (*)     Creatinine 2.2 (*)     AST 43 (*)     eGFR if  28 (*)     eGFR if non  24 (*)     All other components within normal limits   CK - Abnormal; Notable for the following components:     (*)     All other components within normal limits   TROPONIN I - Abnormal; Notable for the following components:    Troponin I 0.031 (*)     All other components within normal limits   ISTAT PROCEDURE - Abnormal; Notable for the following components:    POC PCO2 21.2 (*)     POC PO2 58 (*)     POC HCO3 14.6 (*)     POC SATURATED O2 92 (*)     All other components within normal limits   CULTURE, BLOOD   CULTURE, BLOOD   B-TYPE NATRIURETIC PEPTIDE   URINALYSIS, REFLEX TO  URINE CULTURE   LACTIC ACID, PLASMA        All Lab Results:  Results for orders placed or performed during the hospital encounter of 07/10/20   CBC auto differential   Result Value Ref Range    WBC 3.59 (L) 3.90 - 12.70 K/uL    RBC 5.54 (H) 4.00 - 5.40 M/uL    Hemoglobin 15.1 12.0 - 16.0 g/dL    Hematocrit 46.2 37.0 - 48.5 %    Mean Corpuscular Volume 83 82 - 98 fL    Mean Corpuscular Hemoglobin 27.3 27.0 - 31.0 pg    Mean Corpuscular Hemoglobin Conc 32.7 32.0 - 36.0 g/dL    RDW 14.2 11.5 - 14.5 %    Platelets 194 150 - 350 K/uL    MPV 11.6 9.2 - 12.9 fL    Immature Granulocytes 0.3 0.0 - 0.5 %    Gran # (ANC) 2.3 1.8 - 7.7 K/uL    Immature Grans (Abs) 0.01 0.00 - 0.04 K/uL    Lymph # 1.0 1.0 - 4.8 K/uL    Mono # 0.2 (L) 0.3 - 1.0 K/uL    Eos # 0.0 0.0 - 0.5 K/uL    Baso # 0.01 0.00 - 0.20 K/uL    nRBC 0 0 /100 WBC    Gran% 65.1 38.0 - 73.0 %    Lymph% 27.6 18.0 - 48.0 %    Mono% 6.7 4.0 - 15.0 %    Eosinophil% 0.0 0.0 - 8.0 %    Basophil% 0.3 0.0 - 1.9 %    Differential Method Automated    Comprehensive metabolic panel   Result Value Ref Range    Sodium 137 136 - 145 mmol/L    Potassium 3.6 3.5 - 5.1 mmol/L    Chloride 104 95 - 110 mmol/L    CO2 19 (L) 23 - 29 mmol/L    Glucose 109 70 - 110 mg/dL    BUN, Bld 24 (H) 6 - 20 mg/dL    Creatinine 2.2 (H) 0.5 - 1.4 mg/dL    Calcium 9.1 8.7 - 10.5 mg/dL    Total Protein 7.6 6.0 - 8.4 g/dL    Albumin 3.9 3.5 - 5.2 g/dL    Total Bilirubin 0.4 0.1 - 1.0 mg/dL    Alkaline Phosphatase 89 55 - 135 U/L    AST 43 (H) 10 - 40 U/L    ALT 44 10 - 44 U/L    Anion Gap 14 8 - 16 mmol/L    eGFR if African American 28 (A) >60 mL/min/1.73 m^2    eGFR if non African American 24 (A) >60 mL/min/1.73 m^2   Brain Natriuretic Peptide   Result Value Ref Range    BNP <10 0 - 99 pg/mL   CK   Result Value Ref Range     (H) 20 - 180 U/L   Troponin I   Result Value Ref Range    Troponin I 0.031 (H) 0.000 - 0.026 ng/mL   ISTAT PROCEDURE   Result Value Ref Range    POC PH 7.447 7.35 - 7.45     POC PCO2 21.2 (LL) 35 - 45 mmHg    POC PO2 58 (LL) 80 - 100 mmHg    POC HCO3 14.6 (L) 24 - 28 mmol/L    POC BE -9 -2 to 2 mmol/L    POC SATURATED O2 92 (L) 95 - 100 %    Sample ARTERIAL     Site RR     Allens Test Pass     DelSys Room Air     Mode SPONT     FiO2 21          Imaging Results          X-Ray Chest AP Portable (Final result)  Result time 07/10/20 10:31:47    Final result by VENKATA Schulte Sr., MD (07/10/20 10:31:47)                 Impression:      1. There is a moderate amount of alveolar consolidation scattered throughout the lower 2/3 of both lungs.  This is consistent with the patient's history and characteristic of pneumonia.  2. The size of the heart is prominent.  Some of this may be secondary to magnification.  .      Electronically signed by: Larry Schulte MD  Date:    07/10/2020  Time:    10:31             Narrative:    EXAMINATION:  XR CHEST AP PORTABLE    CLINICAL HISTORY:  sob;    COMPARISON:  None    FINDINGS:  The size of the heart is prominent.  There is a moderate amount of alveolar consolidation scattered throughout the lower 2/3 of both lungs.  There is no pneumothorax.  The costophrenic angles are sharp.                                 The EKG was ordered, reviewed, and independently interpreted by the ED provider.  Interpretation time: 9:21  Rate: 104 BPM  Rhythm: Sinus tachycardia with occasional premature ventricular complexes  Interpretation: Left axis deviation. No STEMI.      The Emergency Provider reviewed the vital signs and test results, which are outlined above.     ED Discussion       10:54 AM: Discussed case with Lety Shell NP (Hospital Medicine). Dr. Regan agrees with current care and management of pt and accepts admission.   Admitting Service: Hospital Medicine  Admit to: obs tele    Re-evaluated pt. I have discussed test results, shared treatment plan, and the need for admission with patient and family at bedside. Pt and family express understanding at this  time and agree with all information. All questions answered. Pt and family have no further questions or concerns at this time. Pt is ready for admit.       MDM        Medical Decision Making:   Clinical Tests:   Lab Tests: Ordered and Reviewed  Radiological Study: Ordered and Reviewed  Medical Tests: Ordered and Reviewed           ED Medication(s):  Medications   vancomycin - pharmacy to dose (has no administration in time range)   piperacillin-tazobactam 4.5 g in dextrose 5 % 100 mL IVPB (ready to mix system) (has no administration in time range)   vancomycin 2 g in dextrose 5 % 500 mL IVPB (has no administration in time range)   ondansetron injection 4 mg (has no administration in time range)   acetaminophen tablet 650 mg (650 mg Oral Given 7/10/20 1018)       New Prescriptions    No medications on file               Scribe Attestation:   Scribe #1: I performed the above scribed service and the documentation accurately describes the services I performed. I attest to the accuracy of the note.     Attending:   Physician Attestation Statement for Scribe #1: I, Ab Trinidad MD, personally performed the services described in this documentation, as scribed by Beatrice Flores, in my presence, and it is both accurate and complete.           Clinical Impression       ICD-10-CM ICD-9-CM   1. COVID-19 virus infection  U07.1    2. SOB (shortness of breath)  R06.02 786.05   3. Hypoxia  R09.02 799.02   4. Acute respiratory failure with hypoxia  J96.01 518.81       Disposition:   Disposition: Placed in Observation  Condition: Fair         Ab Trinidad MD  07/10/20 1107

## 2020-07-10 NOTE — ASSESSMENT & PLAN NOTE
- COVID-19 testing performed on 7/7 at [facility] and reported as POSITIVE; rapid today is negative, however, clinically, patient presents with Covid  - Infection Control notified     - Placed in OBS  - Remdesivir fact she given to patient to review, and at this time she is refusing treatment.  Will revisit if patient changes her mind, or if she becomes more symptomatic.  - Will start on Dexamethasone 6 mg po daily  - BC drawn and are pending  - continue empiric antibiotics  - Inflammatory markers, including D dimer pending      - Isolation:   - Airborne, Contact and Droplet Precautions  - Cohort patients into COVID units  - N95 mask, wear eye protection  - 20 second hand hygiene              - Limit visitors per hospital policy              - Consolidating lab draws, nursing care, provider visits, and interventions    - Diagnostics: (leukopenia, hyponatremia, hyperferritinemia, elevated troponin, elevated d-dimer, age, and comorbidities are significant predictors of poor clinical outcome)  CBC, CMP, Procalcitonin, Ferritin, CRP, LDH, Troponin, ECG, Portable CXR and UA and culture    - Management:  Supplemental O2 to maintain SpO2 >92%  Telemetry  Continuous/intermittent Pulse Ox  Albuterol treatment PRN    Advance Care Planning   Full Code

## 2020-07-10 NOTE — ED NOTES
Pt up to bedside commode with assist. SPO2 maintained 95% on 3L/min nasal cannula, with labored breathing. Pt back in bed, side rails up x2, bed low and locked, pt in upright position, RR 24. Will continue to monitor.   Z line at 47cm

## 2020-07-10 NOTE — SUBJECTIVE & OBJECTIVE
Past Medical History:   Diagnosis Date    Anemia     Hypertension        Past Surgical History:   Procedure Laterality Date    CHOLECYSTECTOMY         Review of patient's allergies indicates:  No Known Allergies    No current facility-administered medications on file prior to encounter.      Current Outpatient Medications on File Prior to Encounter   Medication Sig    amlodipine (NORVASC) 5 MG tablet Take 5 mg by mouth once daily.    aspirin (ECOTRIN) 81 MG EC tablet Take 81 mg by mouth once daily.    biotin 10 mg Tab 1 tablet    Ca carb-Ca gluc-Mg ox-Mg gluco (CALCIUM MAGNESIUM) 500 mg calcium -250 mg Tab 1 tablet with a meal    ferrous gluconate (FERGON) 240 (27 FE) MG tablet Take 480 mg by mouth 3 (three) times daily.    levothyroxine (SYNTHROID) 75 MCG tablet     losartan (COZAAR) 100 MG tablet Take 100 mg by mouth once daily.    melatonin 300 mcg Tab 1 tablet at bedtime as needed with food    naproxen (NAPROSYN) 375 MG tablet Take 1 tablet (375 mg total) by mouth 2 (two) times daily with meals.    omega 3-dha-epa-fish oil 1,000 mg (120 mg-180 mg) Cap 1 capsule    pantoprazole (PROTONIX) 40 MG tablet Take 40 mg by mouth once daily.     potassium chloride SA (K-DUR,KLOR-CON) 20 MEQ tablet     triamterene-hydrochlorothiazide 37.5-25 mg (DYAZIDE) 37.5-25 mg per capsule TAKE 1 CAPSULE EVERY MORNING    [DISCONTINUED] furosemide (LASIX) 20 MG tablet Take 20 mg by mouth 2 (two) times daily.     Family History     None        Tobacco Use    Smoking status: Never Smoker   Substance and Sexual Activity    Alcohol use: No    Drug use: No    Sexual activity: Not on file     Review of Systems   Constitutional: Positive for chills, fatigue and fever. Negative for diaphoresis.   HENT: Negative for facial swelling, hearing loss, mouth sores, sneezing, sore throat, tinnitus and trouble swallowing.    Eyes: Negative for photophobia, pain, discharge, redness and visual disturbance.   Respiratory: Positive for  cough and shortness of breath. Negative for apnea, choking, chest tightness, wheezing and stridor.         C/o dry, non-productive cough.   Cardiovascular: Negative for chest pain, palpitations and leg swelling.   Gastrointestinal: Positive for diarrhea and nausea. Negative for abdominal distention, abdominal pain, anal bleeding, blood in stool, constipation, rectal pain and vomiting.   Endocrine: Negative for cold intolerance, heat intolerance, polydipsia, polyphagia and polyuria.   Genitourinary: Negative for difficulty urinating, dysuria, flank pain, frequency, hematuria, pelvic pain, urgency, vaginal bleeding, vaginal discharge and vaginal pain.   Musculoskeletal: Positive for myalgias. Negative for arthralgias, back pain, gait problem and neck stiffness.   Skin: Negative for pallor, rash and wound.   Allergic/Immunologic: Negative for food allergies.   Neurological: Negative for dizziness, tremors, seizures, syncope, speech difficulty, weakness and headaches.   Hematological: Negative for adenopathy. Does not bruise/bleed easily.   Psychiatric/Behavioral: Negative for behavioral problems and confusion. The patient is not nervous/anxious.    All other systems reviewed and are negative.    Objective:     Vital Signs (Most Recent):  Temp: 99.8 °F (37.7 °C) (07/10/20 0944)  Pulse: 108 (07/10/20 1550)  Resp: (!) 27 (07/10/20 1550)  BP: 128/62 (07/10/20 1550)  SpO2: 96 % (07/10/20 1550) Vital Signs (24h Range):  Temp:  [99.8 °F (37.7 °C)] 99.8 °F (37.7 °C)  Pulse:  [] 108  Resp:  [10-33] 27  SpO2:  [93 %-98 %] 96 %  BP: (102-128)/(59-73) 128/62        There is no height or weight on file to calculate BMI.    Physical Exam  Vitals signs and nursing note reviewed.   Constitutional:       Appearance: She is well-developed. She is obese. She is ill-appearing.      Comments: Morbidly obese, AAF resting comfortably in bed in no acute distress.  Daughter Charley at bedside.   HENT:      Head: Normocephalic and  atraumatic.      Nose: Nose normal.   Eyes:      Conjunctiva/sclera: Conjunctivae normal.      Pupils: Pupils are equal, round, and reactive to light.   Neck:      Musculoskeletal: Normal range of motion and neck supple.   Cardiovascular:      Rate and Rhythm: Regular rhythm. Tachycardia present.      Heart sounds: Normal heart sounds. No murmur. No friction rub. No gallop.    Pulmonary:      Effort: Pulmonary effort is normal. Tachypnea present. No accessory muscle usage or respiratory distress.      Breath sounds: Normal breath sounds. Decreased air movement present. No wheezing or rales.      Comments: Coarse throughout.  Mild tachypnea.  Comfortable on 3 L nasal cannula and in no overt respiratory distress at this time. Frequent, dry, non-productive cough.  Chest:      Chest wall: No tenderness.   Abdominal:      General: Bowel sounds are normal. There is no distension.      Palpations: Abdomen is soft. There is no mass.      Tenderness: There is no abdominal tenderness.   Musculoskeletal: Normal range of motion.         General: No tenderness.   Skin:     General: Skin is warm and dry.      Findings: No erythema or rash.   Neurological:      Mental Status: She is alert and oriented to person, place, and time.   Psychiatric:         Behavior: Behavior normal.         Thought Content: Thought content normal.         Judgment: Judgment normal.           CRANIAL NERVES     CN III, IV, VI   Pupils are equal, round, and reactive to light.       Significant Labs:   ABGs:   Recent Labs   Lab 07/10/20  0931   PH 7.447   PCO2 21.2*   HCO3 14.6*   POCSATURATED 92*   BE -9     CBC:   Recent Labs   Lab 07/10/20  0937 07/10/20  1545   WBC 3.59* 4.58   HGB 15.1 14.9   HCT 46.2 45.6    213     CMP:   Recent Labs   Lab 07/10/20  0937      K 3.6      CO2 19*      BUN 24*   CREATININE 2.2*   CALCIUM 9.1   PROT 7.6   ALBUMIN 3.9   BILITOT 0.4   ALKPHOS 89   AST 43*   ALT 44   ANIONGAP 14   EGFRNONAA 24*      Lactic Acid:   Recent Labs   Lab 07/10/20  1110   LACTATE 1.8     Troponin:   Recent Labs   Lab 07/10/20  0937   TROPONINI 0.031*     Urine Studies:   Recent Labs   Lab 07/10/20  1318   COLORU Yellow   APPEARANCEUA Clear   PHUR 6.0   SPECGRAV 1.025   PROTEINUA 1+*   GLUCUA Negative   KETONESU Trace*   BILIRUBINUA 1+*   OCCULTUA Trace*   NITRITE Negative   UROBILINOGEN Negative   LEUKOCYTESUR Negative   RBCUA 0   WBCUA 3   BACTERIA Few*   SQUAMEPITHEL 3   HYALINECASTS 0       Significant Imaging: I have reviewed all pertinent imaging results/findings within the past 24 hours.

## 2020-07-11 LAB
POCT GLUCOSE: 141 MG/DL (ref 70–110)
TROPONIN I SERPL DL<=0.01 NG/ML-MCNC: 0.02 NG/ML (ref 0–0.03)

## 2020-07-11 PROCEDURE — 21400001 HC TELEMETRY ROOM

## 2020-07-11 PROCEDURE — 25000003 PHARM REV CODE 250: Performed by: NURSE PRACTITIONER

## 2020-07-11 PROCEDURE — 94640 AIRWAY INHALATION TREATMENT: CPT

## 2020-07-11 PROCEDURE — 84484 ASSAY OF TROPONIN QUANT: CPT

## 2020-07-11 PROCEDURE — 84100 ASSAY OF PHOSPHORUS: CPT

## 2020-07-11 PROCEDURE — 63700000 PHARM REV CODE 250 ALT 637 W/O HCPCS: Performed by: NURSE PRACTITIONER

## 2020-07-11 PROCEDURE — 94761 N-INVAS EAR/PLS OXIMETRY MLT: CPT

## 2020-07-11 PROCEDURE — 27000221 HC OXYGEN, UP TO 24 HOURS

## 2020-07-11 PROCEDURE — 86140 C-REACTIVE PROTEIN: CPT

## 2020-07-11 PROCEDURE — 36415 COLL VENOUS BLD VENIPUNCTURE: CPT

## 2020-07-11 PROCEDURE — 80053 COMPREHEN METABOLIC PANEL: CPT

## 2020-07-11 PROCEDURE — 83735 ASSAY OF MAGNESIUM: CPT

## 2020-07-11 PROCEDURE — 63600175 PHARM REV CODE 636 W HCPCS: Performed by: NURSE PRACTITIONER

## 2020-07-11 RX ADMIN — LEVOTHYROXINE SODIUM 75 MCG: 75 TABLET ORAL at 05:07

## 2020-07-11 RX ADMIN — ALBUTEROL SULFATE 2 PUFF: 90 AEROSOL, METERED RESPIRATORY (INHALATION) at 04:07

## 2020-07-11 RX ADMIN — ASPIRIN 81 MG: 81 TABLET, COATED ORAL at 08:07

## 2020-07-11 RX ADMIN — HEPARIN SODIUM 5000 UNITS: 5000 INJECTION, SOLUTION INTRAVENOUS; SUBCUTANEOUS at 02:07

## 2020-07-11 RX ADMIN — ALBUTEROL SULFATE 2 PUFF: 90 AEROSOL, METERED RESPIRATORY (INHALATION) at 08:07

## 2020-07-11 RX ADMIN — AZITHROMYCIN MONOHYDRATE 250 MG: 250 TABLET ORAL at 08:07

## 2020-07-11 RX ADMIN — HEPARIN SODIUM 5000 UNITS: 5000 INJECTION, SOLUTION INTRAVENOUS; SUBCUTANEOUS at 09:07

## 2020-07-11 RX ADMIN — HEPARIN SODIUM 5000 UNITS: 5000 INJECTION, SOLUTION INTRAVENOUS; SUBCUTANEOUS at 05:07

## 2020-07-11 RX ADMIN — SODIUM CHLORIDE 250 ML: 0.9 INJECTION, SOLUTION INTRAVENOUS at 12:07

## 2020-07-11 RX ADMIN — PANTOPRAZOLE SODIUM 40 MG: 40 TABLET, DELAYED RELEASE ORAL at 08:07

## 2020-07-11 RX ADMIN — CEFTRIAXONE 1 G: 1 INJECTION, SOLUTION INTRAVENOUS at 09:07

## 2020-07-11 RX ADMIN — DEXAMETHASONE 6 MG: 4 TABLET ORAL at 08:07

## 2020-07-11 NOTE — NURSING
Patient information sheet regarding Remdesivir provided to patient. Patient verbalizes consent to be treated with Remdesivir. Dr. Velez informed.

## 2020-07-11 NOTE — PROGRESS NOTES
RT called to room. Pt's O2 sat 91% on NC 4lpm. Increased to 6 lpm. Pt's O2 sat increased to 92%. Pt complained of chest pain and arms hurting. Reported to RN. EKG done and reported to Geremias RIOS.

## 2020-07-11 NOTE — PLAN OF CARE
Patient continues to be monitored and treated. Patient is on 5.5 L via nasal cannula. Ambulated patient in the room. Educated patient and patient's family member on plan of care. Patient and patient's family member verbalize understanding and agreement with plan of care. Continuing to monitor lab values, especially renal function. Normal saline continues to infuse at 75ml/hr. Family visiting at bedside today. Safety precautions in place and education provided. Will continue to monitor and treat.

## 2020-07-11 NOTE — NURSING
O2 sats 88% on 4L per NC. Called respiratory for help with O2 increase, maintaining sats. No wheezing on auscultation. Rhonchi throughout. Will monitor closely.

## 2020-07-11 NOTE — NURSING
Called to patient room, patient c/o nausea, sudden, sharp myalgias in bilateral arms. Vital signs obtained. Manuela Phillips NP notified of patient's change in status.

## 2020-07-11 NOTE — PLAN OF CARE
"Patient admitted to tele room 240 this shift from ED via stretcher.  Plan of care reviewed with patient, pt verbalized understanding.  Pt remains free from falls this shift, fall precautions in place.bed alarm set.  Pt remains free from skin breakdown, pt educated on the importance of frequent weight shift to decrease the risk of pressure injury. Pt verbalized understanding.  AAOx4,NAD noted at this time.  BP (!) 100/59 (BP Location: Right arm, Patient Position: Lying)   Pulse 81   Temp 98.7 °F (37.1 °C) (Oral)   Resp 20   Ht 5' 5" (1.651 m)   Wt 119.3 kg (263 lb 0.1 oz)   SpO2 (!) 92%   Breastfeeding No   BMI 43.77 kg/m²    PIV 20 G to R AC , Saline locked  Pt remained afebrile.  2L O2 via NC  Sinus tachy on the tele monitor Box #[id number]  pain controlled by tylenol.  NS @ 75mL/hr.  Pt admitted for COVID. 250ml bolus given for hypotension this shift. No complaints at this time.  Pt currently resting comfortably in bed.  Hourly rounding complete. Bed in lowest position, side rails up, call light in reach.  Pts  currently admitted to ICU  Will continue to monitor.    Problem: Fall Injury Risk  Goal: Absence of Fall and Fall-Related Injury  Outcome: Ongoing, Progressing     Problem: Adult Inpatient Plan of Care  Goal: Plan of Care Review  Outcome: Ongoing, Progressing  Goal: Patient-Specific Goal (Individualization)  Outcome: Ongoing, Progressing  Goal: Absence of Hospital-Acquired Illness or Injury  Outcome: Ongoing, Progressing  Goal: Optimal Comfort and Wellbeing  Outcome: Ongoing, Progressing  Goal: Readiness for Transition of Care  Outcome: Ongoing, Progressing  Goal: Rounds/Family Conference  Outcome: Ongoing, Progressing     Problem: Adjustment to Illness (Sepsis/Septic Shock)  Goal: Optimal Coping  Outcome: Ongoing, Progressing     Problem: Bleeding (Sepsis/Septic Shock)  Goal: Absence of Bleeding  Outcome: Ongoing, Progressing     Problem: Glycemic Control Impaired (Sepsis/Septic Shock)  Goal: " Blood Glucose Level Within Desired Range  Outcome: Ongoing, Progressing     Problem: Hemodynamic Instability (Sepsis/Septic Shock)  Goal: Effective Tissue Perfusion  Outcome: Ongoing, Progressing     Problem: Infection (Sepsis/Septic Shock)  Goal: Absence of Infection Signs/Symptoms  Outcome: Ongoing, Progressing     Problem: Nutrition Impaired (Sepsis/Septic Shock)  Goal: Optimal Nutrition Intake  Outcome: Ongoing, Progressing     Problem: Respiratory Compromise (Sepsis/Septic Shock)  Goal: Effective Oxygenation and Ventilation  Outcome: Ongoing, Progressing     Problem: Electrolyte Imbalance (Acute Kidney Injury/Impairment)  Goal: Serum Electrolyte Balance  Outcome: Ongoing, Progressing     Problem: Fluid Imbalance (Acute Kidney Injury/Impairment)  Goal: Optimal Fluid Balance  Outcome: Ongoing, Progressing     Problem: Hematologic Alteration (Acute Kidney Injury/Impairment)  Goal: Hemoglobin, Hematocrit and Platelets Within Normal Range  Outcome: Ongoing, Progressing     Problem: Oral Intake Inadequate (Acute Kidney Injury/Impairment)  Goal: Optimal Nutrition Intake  Outcome: Ongoing, Progressing     Problem: Renal Function Impairment (Acute Kidney Injury/Impairment)  Goal: Effective Renal Function  Outcome: Ongoing, Progressing     Problem: Fluid Imbalance (Pneumonia)  Goal: Fluid Balance  Outcome: Ongoing, Progressing     Problem: Infection (Pneumonia)  Goal: Resolution of Infection Signs/Symptoms  Outcome: Ongoing, Progressing     Problem: Respiratory Compromise (Pneumonia)  Goal: Effective Oxygenation and Ventilation  Outcome: Ongoing, Progressing     Problem: Bariatric Environmental Safety  Goal: Safety Maintained with Care  Outcome: Ongoing, Progressing

## 2020-07-11 NOTE — PLAN OF CARE
Contacted patient via telephone due to covid status to complete initial assessment. Patient is independent with all her adl's and iadls. She does not anticipate any discharge needs. Discussed home oxygen evaluation prior to discharge. Her spouse is current in ICU due to covid. CM will continue to follow.          D/C Plan: home  PCP: KEITH Samson MD  Preferred Pharmacy: [facility] [address], [place]  Discharge transportation: pov  My [facility]: declined  Pharmacy Bedside Delivery: yes         07/11/20 1230   Discharge Assessment   Assessment Type Discharge Planning Assessment   Confirmed/corrected address and phone number on facesheet? Yes   Assessment information obtained from? Patient;Medical Record   Expected Length of Stay (days)   (tbd)   Communicated expected length of stay with patient/caregiver yes   Prior to hospitilization cognitive status: Alert/Oriented   Prior to hospitalization functional status: Independent   Current cognitive status: Alert/Oriented   Current Functional Status: Needs Assistance   Facility Arrived From: home   Lives With spouse;child(vel), adult   Able to Return to Prior Arrangements yes   Is patient able to care for self after discharge? Unable to determine at this time (comments)   Who are your caregiver(s) and their phone number(s)? Evangelista Weir (spouse )[phone]   Patient's perception of discharge disposition home or selfcare   Readmission Within the Last 30 Days no previous admission in last 30 days   Patient currently being followed by outpatient case management? No   Patient currently receives any other outside agency services? No   Equipment Currently Used at Home none   Do you have any problems affording any of your prescribed medications? No   Is the patient taking medications as prescribed? yes   Does the patient have transportation home? Yes   Transportation Anticipated family or friend will provide   DME Needed Upon Discharge  oxygen   Patient/Family in Agreement with Plan yes

## 2020-07-12 LAB
ALBUMIN SERPL BCP-MCNC: 3.2 G/DL (ref 3.5–5.2)
ALP SERPL-CCNC: 77 U/L (ref 55–135)
ALT SERPL W/O P-5'-P-CCNC: 39 U/L (ref 10–44)
ANION GAP SERPL CALC-SCNC: 15 MMOL/L (ref 8–16)
AST SERPL-CCNC: 36 U/L (ref 10–40)
BASOPHILS # BLD AUTO: 0.01 K/UL (ref 0–0.2)
BASOPHILS NFR BLD: 0.1 % (ref 0–1.9)
BILIRUB SERPL-MCNC: 0.2 MG/DL (ref 0.1–1)
BUN SERPL-MCNC: 23 MG/DL (ref 6–20)
CALCIUM SERPL-MCNC: 9.4 MG/DL (ref 8.7–10.5)
CHLORIDE SERPL-SCNC: 108 MMOL/L (ref 95–110)
CO2 SERPL-SCNC: 18 MMOL/L (ref 23–29)
CREAT SERPL-MCNC: 1.5 MG/DL (ref 0.5–1.4)
CRP SERPL-MCNC: 61.8 MG/L (ref 0–8.2)
DIFFERENTIAL METHOD: ABNORMAL
EOSINOPHIL # BLD AUTO: 0 K/UL (ref 0–0.5)
EOSINOPHIL NFR BLD: 0 % (ref 0–8)
ERYTHROCYTE [DISTWIDTH] IN BLOOD BY AUTOMATED COUNT: 14.7 % (ref 11.5–14.5)
EST. GFR  (AFRICAN AMERICAN): 45 ML/MIN/1.73 M^2
EST. GFR  (NON AFRICAN AMERICAN): 39 ML/MIN/1.73 M^2
GLUCOSE SERPL-MCNC: 137 MG/DL (ref 70–110)
HCT VFR BLD AUTO: 37.6 % (ref 37–48.5)
HGB BLD-MCNC: 12.3 G/DL (ref 12–16)
IMM GRANULOCYTES # BLD AUTO: 0.11 K/UL (ref 0–0.04)
IMM GRANULOCYTES NFR BLD AUTO: 1.2 % (ref 0–0.5)
LYMPHOCYTES # BLD AUTO: 0.8 K/UL (ref 1–4.8)
LYMPHOCYTES NFR BLD: 8.5 % (ref 18–48)
MAGNESIUM SERPL-MCNC: 2 MG/DL (ref 1.6–2.6)
MCH RBC QN AUTO: 27.5 PG (ref 27–31)
MCHC RBC AUTO-ENTMCNC: 32.7 G/DL (ref 32–36)
MCV RBC AUTO: 84 FL (ref 82–98)
MONOCYTES # BLD AUTO: 0.4 K/UL (ref 0.3–1)
MONOCYTES NFR BLD: 4.2 % (ref 4–15)
NEUTROPHILS # BLD AUTO: 8.1 K/UL (ref 1.8–7.7)
NEUTROPHILS NFR BLD: 86 % (ref 38–73)
NRBC BLD-RTO: 0 /100 WBC
PHOSPHATE SERPL-MCNC: 2.3 MG/DL (ref 2.7–4.5)
PLATELET # BLD AUTO: 245 K/UL (ref 150–350)
PMV BLD AUTO: 11.8 FL (ref 9.2–12.9)
POTASSIUM SERPL-SCNC: 3.8 MMOL/L (ref 3.5–5.1)
PROT SERPL-MCNC: 6.7 G/DL (ref 6–8.4)
RBC # BLD AUTO: 4.47 M/UL (ref 4–5.4)
SODIUM SERPL-SCNC: 141 MMOL/L (ref 136–145)
WBC # BLD AUTO: 9.43 K/UL (ref 3.9–12.7)

## 2020-07-12 PROCEDURE — 99900035 HC TECH TIME PER 15 MIN (STAT)

## 2020-07-12 PROCEDURE — 25000003 PHARM REV CODE 250: Performed by: INTERNAL MEDICINE

## 2020-07-12 PROCEDURE — 36415 COLL VENOUS BLD VENIPUNCTURE: CPT

## 2020-07-12 PROCEDURE — 94761 N-INVAS EAR/PLS OXIMETRY MLT: CPT

## 2020-07-12 PROCEDURE — 27000221 HC OXYGEN, UP TO 24 HOURS

## 2020-07-12 PROCEDURE — 63700000 PHARM REV CODE 250 ALT 637 W/O HCPCS: Performed by: NURSE PRACTITIONER

## 2020-07-12 PROCEDURE — 85025 COMPLETE CBC W/AUTO DIFF WBC: CPT

## 2020-07-12 PROCEDURE — 25000003 PHARM REV CODE 250: Performed by: NURSE PRACTITIONER

## 2020-07-12 PROCEDURE — 21400001 HC TELEMETRY ROOM

## 2020-07-12 PROCEDURE — 63600175 PHARM REV CODE 636 W HCPCS: Performed by: NURSE PRACTITIONER

## 2020-07-12 PROCEDURE — 27100171 HC OXYGEN HIGH FLOW UP TO 24 HOURS

## 2020-07-12 PROCEDURE — 94640 AIRWAY INHALATION TREATMENT: CPT

## 2020-07-12 RX ADMIN — BENZONATATE 100 MG: 100 CAPSULE ORAL at 08:07

## 2020-07-12 RX ADMIN — SODIUM CHLORIDE: 0.9 INJECTION, SOLUTION INTRAVENOUS at 02:07

## 2020-07-12 RX ADMIN — HEPARIN SODIUM 5000 UNITS: 5000 INJECTION, SOLUTION INTRAVENOUS; SUBCUTANEOUS at 09:07

## 2020-07-12 RX ADMIN — ALBUTEROL SULFATE 2 PUFF: 90 AEROSOL, METERED RESPIRATORY (INHALATION) at 12:07

## 2020-07-12 RX ADMIN — HEPARIN SODIUM 5000 UNITS: 5000 INJECTION, SOLUTION INTRAVENOUS; SUBCUTANEOUS at 02:07

## 2020-07-12 RX ADMIN — ALBUTEROL SULFATE 2 PUFF: 90 AEROSOL, METERED RESPIRATORY (INHALATION) at 07:07

## 2020-07-12 RX ADMIN — ASPIRIN 81 MG: 81 TABLET, COATED ORAL at 09:07

## 2020-07-12 RX ADMIN — BENZONATATE 100 MG: 100 CAPSULE ORAL at 12:07

## 2020-07-12 RX ADMIN — SODIUM CHLORIDE: 0.9 INJECTION, SOLUTION INTRAVENOUS at 12:07

## 2020-07-12 RX ADMIN — CEFTRIAXONE 1 G: 1 INJECTION, SOLUTION INTRAVENOUS at 08:07

## 2020-07-12 RX ADMIN — LEVOTHYROXINE SODIUM 75 MCG: 75 TABLET ORAL at 05:07

## 2020-07-12 RX ADMIN — ACETAMINOPHEN 650 MG: 325 TABLET ORAL at 09:07

## 2020-07-12 RX ADMIN — DEXAMETHASONE 6 MG: 4 TABLET ORAL at 12:07

## 2020-07-12 RX ADMIN — AZITHROMYCIN MONOHYDRATE 250 MG: 250 TABLET ORAL at 09:07

## 2020-07-12 RX ADMIN — PROMETHAZINE HYDROCHLORIDE AND CODEINE PHOSPHATE 5 ML: 6.25; 1 SOLUTION ORAL at 08:07

## 2020-07-12 RX ADMIN — PANTOPRAZOLE SODIUM 40 MG: 40 TABLET, DELAYED RELEASE ORAL at 09:07

## 2020-07-12 RX ADMIN — HEPARIN SODIUM 5000 UNITS: 5000 INJECTION, SOLUTION INTRAVENOUS; SUBCUTANEOUS at 05:07

## 2020-07-12 RX ADMIN — PROMETHAZINE HYDROCHLORIDE AND CODEINE PHOSPHATE 5 ML: 6.25; 1 SOLUTION ORAL at 12:07

## 2020-07-12 RX ADMIN — ACETAMINOPHEN 650 MG: 325 TABLET ORAL at 03:07

## 2020-07-12 RX ADMIN — BENZONATATE 100 MG: 100 CAPSULE ORAL at 03:07

## 2020-07-12 RX ADMIN — SODIUM CHLORIDE 200 MG: 0.9 INJECTION, SOLUTION INTRAVENOUS at 11:07

## 2020-07-12 RX ADMIN — ALBUTEROL SULFATE 2 PUFF: 90 AEROSOL, METERED RESPIRATORY (INHALATION) at 03:07

## 2020-07-12 NOTE — ASSESSMENT & PLAN NOTE
- COVID-19 testing performed on 7/7 at [facility] and reported as POSITIVE; rapid today is negative, however, clinically, patient presents with Covid  - Infection Control notified   - Will start on Dexamethasone 6 mg po daily  - BC drawn and are pending.  Continue empiric antibiotics  - Inflammatory markers, including D dimer pending  Plan to initiate Remdesivir treatment.    Remdesivir FDA EUA Verbal Consent  The patient or parent/caregiver has been provided with the remdesivir Fact Sheet for Patients and Parents/Caregivers and has been counseled that the FDA has authorized the emergency use of remdesivir, which is not an FDA approved drug. The significant known and potential risks and benefits are unknown. The patient or parent/caregiver has been given the option to accept or refuse and has verbally agreed to receive remdesivir. Daily labs will be ordered and monitoring for Serious Adverse Events will be performed.    - Isolation:   - Airborne, Contact and Droplet Precautions  - Cohort patients into COVID units  - N95 mask, wear eye protection  - 20 second hand hygiene              - Limit visitors per hospital policy              - Consolidating lab draws, nursing care, provider visits, and interventions    - Diagnostics: (leukopenia, hyponatremia, hyperferritinemia, elevated troponin, elevated d-dimer, age, and comorbidities are significant predictors of poor clinical outcome)  CBC, CMP, Procalcitonin, Ferritin, CRP, LDH, Troponin, ECG, Portable CXR and UA and culture    - Management:  Supplemental O2 to maintain SpO2 >92%  Telemetry  Continuous/intermittent Pulse Ox  Albuterol treatment PRN    Advance Care Planning   Full Code

## 2020-07-12 NOTE — SUBJECTIVE & OBJECTIVE
Interval History:  Breathing comfortably at rest.  Cough productive.  No chest pain.  No nausea or vomiting.    Review of Systems   Constitutional: Negative for chills and fever.   HENT: Negative for congestion and sore throat.    Eyes: Negative for visual disturbance.   Respiratory: Positive for cough and shortness of breath. Negative for wheezing.    Cardiovascular: Negative for chest pain, palpitations and leg swelling.   Gastrointestinal: Negative for abdominal pain, blood in stool, constipation, diarrhea, nausea and vomiting.   Genitourinary: Negative for dysuria and hematuria.   Musculoskeletal: Negative for arthralgias and back pain.   Skin: Negative for rash and wound.   Neurological: Negative for dizziness, weakness, light-headedness and numbness.   Hematological: Negative for adenopathy.     Objective:     Vital Signs (Most Recent):  Temp: 98 °F (36.7 °C) (07/11/20 1920)  Pulse: 90 (07/11/20 2100)  Resp: 18 (07/11/20 1920)  BP: 114/68 (07/11/20 1920)  SpO2: (!) 92 % (07/11/20 2158) Vital Signs (24h Range):  Temp:  [97.7 °F (36.5 °C)-98.8 °F (37.1 °C)] 98 °F (36.7 °C)  Pulse:  [76-93] 90  Resp:  [16-20] 18  SpO2:  [91 %-94 %] 92 %  BP: ()/(59-69) 114/68     Weight: 120 kg (264 lb 8.8 oz)  Body mass index is 44.02 kg/m².    Intake/Output Summary (Last 24 hours) at 7/11/2020 2076  Last data filed at 7/11/2020 1500  Gross per 24 hour   Intake 1207.5 ml   Output 300 ml   Net 907.5 ml      Physical Exam  Vitals signs and nursing note reviewed.   Constitutional:       General: She is not in acute distress.     Appearance: She is well-developed.   HENT:      Head: Normocephalic and atraumatic.   Eyes:      Conjunctiva/sclera: Conjunctivae normal.      Pupils: Pupils are equal, round, and reactive to light.   Neck:      Musculoskeletal: Neck supple.      Thyroid: No thyromegaly.      Vascular: No JVD.   Cardiovascular:      Rate and Rhythm: Normal rate and regular rhythm.      Heart sounds: No murmur. No  friction rub. No gallop.    Pulmonary:      Effort: Pulmonary effort is normal.      Breath sounds: Normal breath sounds. No wheezing or rales.   Abdominal:      General: Bowel sounds are normal. There is no distension.      Palpations: Abdomen is soft.      Tenderness: There is no abdominal tenderness. There is no guarding or rebound.   Musculoskeletal: Normal range of motion.         General: No deformity.   Lymphadenopathy:      Cervical: No cervical adenopathy.   Skin:     General: Skin is warm and dry.      Findings: No rash.   Neurological:      Mental Status: She is alert and oriented to person, place, and time.      Deep Tendon Reflexes: Reflexes are normal and symmetric.   Psychiatric:         Behavior: Behavior normal.         Thought Content: Thought content normal.         Judgment: Judgment normal.         Significant Labs: All pertinent labs within the past 24 hours have been reviewed.    Significant Imaging: I have reviewed all pertinent imaging results/findings within the past 24 hours.

## 2020-07-12 NOTE — SUBJECTIVE & OBJECTIVE
Interval History:  Increased respiratory distress overnight.  Cough productive.  No chest pain.  No nausea or vomiting.    Review of Systems   Constitutional: Negative for chills and fever.   HENT: Negative for congestion and sore throat.    Eyes: Negative for visual disturbance.   Respiratory: Positive for cough and shortness of breath. Negative for wheezing.    Cardiovascular: Negative for chest pain, palpitations and leg swelling.   Gastrointestinal: Negative for abdominal pain, blood in stool, constipation, diarrhea, nausea and vomiting.   Genitourinary: Negative for dysuria and hematuria.   Musculoskeletal: Negative for arthralgias and back pain.   Skin: Negative for rash and wound.   Neurological: Negative for dizziness, weakness, light-headedness and numbness.   Hematological: Negative for adenopathy.     Objective:     Vital Signs (Most Recent):  Temp: 98.7 °F (37.1 °C) (07/12/20 1635)  Pulse: 92 (07/12/20 1635)  Resp: 17 (07/12/20 1635)  BP: 115/72 (07/12/20 1635)  SpO2: (!) 91 % (07/12/20 1635) Vital Signs (24h Range):  Temp:  [98 °F (36.7 °C)-99.8 °F (37.7 °C)] 98.7 °F (37.1 °C)  Pulse:  [] 92  Resp:  [17-22] 17  SpO2:  [87 %-96 %] 91 %  BP: ()/(54-73) 115/72     Weight: 120.3 kg (265 lb 3.4 oz)  Body mass index is 44.13 kg/m².    Intake/Output Summary (Last 24 hours) at 7/12/2020 1743  Last data filed at 7/12/2020 1500  Gross per 24 hour   Intake 1270 ml   Output 300 ml   Net 970 ml      Physical Exam  Vitals signs and nursing note reviewed.   Constitutional:       General: She is not in acute distress.     Appearance: She is well-developed.   HENT:      Head: Normocephalic and atraumatic.   Eyes:      Conjunctiva/sclera: Conjunctivae normal.      Pupils: Pupils are equal, round, and reactive to light.   Neck:      Musculoskeletal: Neck supple.      Thyroid: No thyromegaly.      Vascular: No JVD.   Cardiovascular:      Rate and Rhythm: Normal rate and regular rhythm.      Heart sounds: No  murmur. No friction rub. No gallop.    Pulmonary:      Effort: Pulmonary effort is normal.      Breath sounds: Normal breath sounds. No wheezing or rales.   Abdominal:      General: Bowel sounds are normal. There is no distension.      Palpations: Abdomen is soft.      Tenderness: There is no abdominal tenderness. There is no guarding or rebound.   Musculoskeletal: Normal range of motion.         General: No deformity.   Lymphadenopathy:      Cervical: No cervical adenopathy.   Skin:     General: Skin is warm and dry.      Findings: No rash.   Neurological:      Mental Status: She is alert and oriented to person, place, and time.      Deep Tendon Reflexes: Reflexes are normal and symmetric.   Psychiatric:         Behavior: Behavior normal.         Thought Content: Thought content normal.         Judgment: Judgment normal.         Significant Labs: All pertinent labs within the past 24 hours have been reviewed.    Significant Imaging: I have reviewed all pertinent imaging results/findings within the past 24 hours.

## 2020-07-12 NOTE — PROGRESS NOTES
[facility] Hospital Medicine  Progress Note    Patient Name: Barbara Weir  MRN: [id number]  Patient Class: IP- Inpatient   Admission Date: 7/10/2020  Length of Stay: 2 days  Attending Physician: Tomer Velez MD  Primary Care Provider: Daniella Samson MD        Subjective:     Principal Problem:COVID-19 virus infection        HPI:  Babrara Weir is a 55 y.o. AAF with a PMHx of Morbid obesity, Anemia, and HTN, who presented to the Emergency Department today for evaluation of worsening SOB, which onset a few days ago.  Symptoms are constant and moderate in severity.  Of note, patient reportedly tested positive for COVID-19 on 7/7/20 (test obtained at [facility]), but repeat rapid test today noted to be negative.  Patient's  also tested positive and was admitted today.  No mitigating or exacerbating factors reported.  Associated sxs include SOB, myalgias, cough, nausea, subjective fever, chills, and fatigue. Patient denies any CP, vomiting, diarrhea, dysuria, HA, and all other sxs at this time.  No prior Tx reported.  No further complaints or concerns at this time.  ER workup showed; tachycardia and tachypnea.  SaO2 on nasal cannula 93%.  CBC showed WBCs of 3.59, otherwise unremarkable.  CMP showed creatinine 2.2, CO2 19, AST 43, otherwise unremarkable.  BNP less than 10.  .  Lactic acid 1.8.  ABG showed; ph 7.447, Pco2 21.2, HCO3 14.6, Po2 58 on RA.  CXR showed a moderate amount of alveolar consolidation scattered throughout the lower 2/3 of both lungs.  This is consistent with the patient's history and characteristic of pneumonia.  The size of the heart is prominent.  Some of this may be secondary to magnification.  BC were drawn and patient was given Vanc and Zosyn in the ER.  Hospital Medicine contacted for admission.  Patient will be placed in observation.  She is a full code.  Her SDM is her daughter Charley who can be reached at [phone].    Overview/Hospital  Course:  No notes on file    Interval History:  Increased respiratory distress overnight.  Cough productive.  No chest pain.  No nausea or vomiting.    Review of Systems   Constitutional: Negative for chills and fever.   HENT: Negative for congestion and sore throat.    Eyes: Negative for visual disturbance.   Respiratory: Positive for cough and shortness of breath. Negative for wheezing.    Cardiovascular: Negative for chest pain, palpitations and leg swelling.   Gastrointestinal: Negative for abdominal pain, blood in stool, constipation, diarrhea, nausea and vomiting.   Genitourinary: Negative for dysuria and hematuria.   Musculoskeletal: Negative for arthralgias and back pain.   Skin: Negative for rash and wound.   Neurological: Negative for dizziness, weakness, light-headedness and numbness.   Hematological: Negative for adenopathy.     Objective:     Vital Signs (Most Recent):  Temp: 98.7 °F (37.1 °C) (07/12/20 1635)  Pulse: 92 (07/12/20 1635)  Resp: 17 (07/12/20 1635)  BP: 115/72 (07/12/20 1635)  SpO2: (!) 91 % (07/12/20 1635) Vital Signs (24h Range):  Temp:  [98 °F (36.7 °C)-99.8 °F (37.7 °C)] 98.7 °F (37.1 °C)  Pulse:  [] 92  Resp:  [17-22] 17  SpO2:  [87 %-96 %] 91 %  BP: ()/(54-73) 115/72     Weight: 120.3 kg (265 lb 3.4 oz)  Body mass index is 44.13 kg/m².    Intake/Output Summary (Last 24 hours) at 7/12/2020 4843  Last data filed at 7/12/2020 1500  Gross per 24 hour   Intake 1270 ml   Output 300 ml   Net 970 ml      Physical Exam  Vitals signs and nursing note reviewed.   Constitutional:       General: She is not in acute distress.     Appearance: She is well-developed.   HENT:      Head: Normocephalic and atraumatic.   Eyes:      Conjunctiva/sclera: Conjunctivae normal.      Pupils: Pupils are equal, round, and reactive to light.   Neck:      Musculoskeletal: Neck supple.      Thyroid: No thyromegaly.      Vascular: No JVD.   Cardiovascular:      Rate and Rhythm: Normal rate and regular  rhythm.      Heart sounds: No murmur. No friction rub. No gallop.    Pulmonary:      Effort: Pulmonary effort is normal.      Breath sounds: Normal breath sounds. No wheezing or rales.   Abdominal:      General: Bowel sounds are normal. There is no distension.      Palpations: Abdomen is soft.      Tenderness: There is no abdominal tenderness. There is no guarding or rebound.   Musculoskeletal: Normal range of motion.         General: No deformity.   Lymphadenopathy:      Cervical: No cervical adenopathy.   Skin:     General: Skin is warm and dry.      Findings: No rash.   Neurological:      Mental Status: She is alert and oriented to person, place, and time.      Deep Tendon Reflexes: Reflexes are normal and symmetric.   Psychiatric:         Behavior: Behavior normal.         Thought Content: Thought content normal.         Judgment: Judgment normal.         Significant Labs: All pertinent labs within the past 24 hours have been reviewed.    Significant Imaging: I have reviewed all pertinent imaging results/findings within the past 24 hours.      Assessment/Plan:      * COVID-19 virus infection   - COVID-19 testing performed on 7/7 at [facility] and reported as POSITIVE; rapid today is negative, however, clinically, patient presents with Covid  - Infection Control notified   - Will start on Dexamethasone 6 mg po daily  - BC drawn and are pending.  Continue empiric antibiotics  - Inflammatory markers, including D dimer pending  Plan to initiate Remdesivir treatment.    Remdesivir FDA EUA Verbal Consent  The patient or parent/caregiver has been provided with the remdesivir Fact Sheet for Patients and Parents/Caregivers and has been counseled that the FDA has authorized the emergency use of remdesivir, which is not an FDA approved drug. The significant known and potential risks and benefits are unknown. The patient or parent/caregiver has been given the option to accept or refuse and has verbally agreed to receive  remdesivir. Daily labs will be ordered and monitoring for Serious Adverse Events will be performed.    - Isolation:   - Airborne, Contact and Droplet Precautions  - Cohort patients into COVID units  - N95 mask, wear eye protection  - 20 second hand hygiene              - Limit visitors per hospital policy              - Consolidating lab draws, nursing care, provider visits, and interventions    - Diagnostics: (leukopenia, hyponatremia, hyperferritinemia, elevated troponin, elevated d-dimer, age, and comorbidities are significant predictors of poor clinical outcome)  CBC, CMP, Procalcitonin, Ferritin, CRP, LDH, Troponin, ECG, Portable CXR and UA and culture    - Management:  Supplemental O2 to maintain SpO2 >92%  Telemetry  Continuous/intermittent Pulse Ox  Albuterol treatment PRN    Advance Care Planning   Full Code             GERD (gastroesophageal reflux disease)  - Continue Protonix      Hypothyroidism  - TSH pending  - continue home Synthroid and adjust dose if need be  - outpatient follow-up with PCP      Morbid obesity  -  on self-directed weight loss and need for increased physical activity      HTN (hypertension)  - BP well controlled  - Hold home losartan and HCTZ given ASHLEY  - Resume home Amlodipine pending blood pressure trends      Elevated troponin  - 12 lead EKG showed sinus tachycardia with occasional Premature ventricular complexes  - Currently denies chest pain and/or equivalent  - Initial troponin 0.031, will trend  - Tele monitoring      ASHLEY (acute kidney injury)  - Cr elevated to 2.2 (last known Cr was 1.1 in 2004)  - Hold home losartan and HCTZ for now  - Gentle IVFs  - Caution with nephrotoxic medications  - Monitor with BMP      Acute hypoxemic respiratory failure  - secondary to COVID-19 viral infection in addition to bilateral pneumonia  - ABG showed Po2 of 58 on room air  - treat pneumonia  - continue supplemental oxygen to [place] >92%  - Will need home O2 eval prior to DC  -  monitor      Sepsis  - Tachycardic and tachypneic  - Lactic acid 1.8  - Procalcitonin pending  - Likely secondary to COVID-19 viral infection in addition to bilateral pneumonia  - BC drawn and are pending  - Will hold off on IV fluid bolus for now as patient is hemodynamically stable  - Continue IV ABX  - Monitor      Bilateral pneumonia  - BC drawn and are pending  - will attempt to obtain sputum culture  - Given Vanc and Zosyn in the ER, will transition to Azithromycin and Rocephin per protocol  - Albuterol inhaler PRN  - Supplemental O2 to KS >92  - Monitor         VTE Risk Mitigation (From admission, onward)         Ordered     heparin (porcine) injection 5,000 Units  Every 8 hours      07/10/20 1453     IP VTE HIGH RISK PATIENT  Once      07/10/20 1453     Place sequential compression device  Until discontinued      07/10/20 1453                      Tomer Velez MD  Department of Hospital Medicine   [facility]

## 2020-07-12 NOTE — PROGRESS NOTES
[facility] Hospital Medicine  Progress Note    Patient Name: Barbara Weir  MRN: [id number]  Patient Class: IP- Inpatient   Admission Date: 7/10/2020  Length of Stay: 1 days  Attending Physician: Tomer Velez MD  Primary Care Provider: Daniella Samson MD        Subjective:     Principal Problem:COVID-19 virus infection        HPI:  Barbara Weir is a 55 y.o. AAF with a PMHx of Morbid obesity, Anemia, and HTN, who presented to the Emergency Department today for evaluation of worsening SOB, which onset a few days ago.  Symptoms are constant and moderate in severity.  Of note, patient reportedly tested positive for COVID-19 on 7/7/20 (test obtained at [facility]), but repeat rapid test today noted to be negative.  Patient's  also tested positive and was admitted today.  No mitigating or exacerbating factors reported.  Associated sxs include SOB, myalgias, cough, nausea, subjective fever, chills, and fatigue. Patient denies any CP, vomiting, diarrhea, dysuria, HA, and all other sxs at this time.  No prior Tx reported.  No further complaints or concerns at this time.  ER workup showed; tachycardia and tachypnea.  SaO2 on nasal cannula 93%.  CBC showed WBCs of 3.59, otherwise unremarkable.  CMP showed creatinine 2.2, CO2 19, AST 43, otherwise unremarkable.  BNP less than 10.  .  Lactic acid 1.8.  ABG showed; ph 7.447, Pco2 21.2, HCO3 14.6, Po2 58 on RA.  CXR showed a moderate amount of alveolar consolidation scattered throughout the lower 2/3 of both lungs.  This is consistent with the patient's history and characteristic of pneumonia.  The size of the heart is prominent.  Some of this may be secondary to magnification.  BC were drawn and patient was given Vanc and Zosyn in the ER.  Hospital Medicine contacted for admission.  Patient will be placed in observation.  She is a full code.  Her SDM is her daughter Charley who can be reached at [phone].    Overview/Hospital  Course:  No notes on file    Interval History:  Breathing comfortably at rest.  Cough productive.  No chest pain.  No nausea or vomiting.    Review of Systems   Constitutional: Negative for chills and fever.   HENT: Negative for congestion and sore throat.    Eyes: Negative for visual disturbance.   Respiratory: Positive for cough and shortness of breath. Negative for wheezing.    Cardiovascular: Negative for chest pain, palpitations and leg swelling.   Gastrointestinal: Negative for abdominal pain, blood in stool, constipation, diarrhea, nausea and vomiting.   Genitourinary: Negative for dysuria and hematuria.   Musculoskeletal: Negative for arthralgias and back pain.   Skin: Negative for rash and wound.   Neurological: Negative for dizziness, weakness, light-headedness and numbness.   Hematological: Negative for adenopathy.     Objective:     Vital Signs (Most Recent):  Temp: 98 °F (36.7 °C) (07/11/20 1920)  Pulse: 90 (07/11/20 2100)  Resp: 18 (07/11/20 1920)  BP: 114/68 (07/11/20 1920)  SpO2: (!) 92 % (07/11/20 2158) Vital Signs (24h Range):  Temp:  [97.7 °F (36.5 °C)-98.8 °F (37.1 °C)] 98 °F (36.7 °C)  Pulse:  [76-93] 90  Resp:  [16-20] 18  SpO2:  [91 %-94 %] 92 %  BP: ()/(59-69) 114/68     Weight: 120 kg (264 lb 8.8 oz)  Body mass index is 44.02 kg/m².    Intake/Output Summary (Last 24 hours) at 7/11/2020 2326  Last data filed at 7/11/2020 1500  Gross per 24 hour   Intake 1207.5 ml   Output 300 ml   Net 907.5 ml      Physical Exam  Vitals signs and nursing note reviewed.   Constitutional:       General: She is not in acute distress.     Appearance: She is well-developed.   HENT:      Head: Normocephalic and atraumatic.   Eyes:      Conjunctiva/sclera: Conjunctivae normal.      Pupils: Pupils are equal, round, and reactive to light.   Neck:      Musculoskeletal: Neck supple.      Thyroid: No thyromegaly.      Vascular: No JVD.   Cardiovascular:      Rate and Rhythm: Normal rate and regular rhythm.       Heart sounds: No murmur. No friction rub. No gallop.    Pulmonary:      Effort: Pulmonary effort is normal.      Breath sounds: Normal breath sounds. No wheezing or rales.   Abdominal:      General: Bowel sounds are normal. There is no distension.      Palpations: Abdomen is soft.      Tenderness: There is no abdominal tenderness. There is no guarding or rebound.   Musculoskeletal: Normal range of motion.         General: No deformity.   Lymphadenopathy:      Cervical: No cervical adenopathy.   Skin:     General: Skin is warm and dry.      Findings: No rash.   Neurological:      Mental Status: She is alert and oriented to person, place, and time.      Deep Tendon Reflexes: Reflexes are normal and symmetric.   Psychiatric:         Behavior: Behavior normal.         Thought Content: Thought content normal.         Judgment: Judgment normal.         Significant Labs: All pertinent labs within the past 24 hours have been reviewed.    Significant Imaging: I have reviewed all pertinent imaging results/findings within the past 24 hours.      Assessment/Plan:      * COVID-19 virus infection   - COVID-19 testing performed on 7/7 at [facility] and reported as POSITIVE; rapid today is negative, however, clinically, patient presents with Covid  - Infection Control notified     - Placed in OBS  - Remdesivir fact she given to patient to review, and at this time she is refusing treatment.  Will revisit if patient changes her mind, or if she becomes more symptomatic.  - Will start on Dexamethasone 6 mg po daily  - BC drawn and are pending  - continue empiric antibiotics  - Inflammatory markers, including D dimer pending      - Isolation:   - Airborne, Contact and Droplet Precautions  - Cohort patients into COVID units  - N95 mask, wear eye protection  - 20 second hand hygiene              - Limit visitors per hospital policy              - Consolidating lab draws, nursing care, provider visits, and interventions    - Diagnostics:  (leukopenia, hyponatremia, hyperferritinemia, elevated troponin, elevated d-dimer, age, and comorbidities are significant predictors of poor clinical outcome)  CBC, CMP, Procalcitonin, Ferritin, CRP, LDH, Troponin, ECG, Portable CXR and UA and culture    - Management:  Supplemental O2 to maintain SpO2 >92%  Telemetry  Continuous/intermittent Pulse Ox  Albuterol treatment PRN    Advance Care Planning   Full Code             GERD (gastroesophageal reflux disease)  - Continue Protonix      Hypothyroidism  - TSH pending  - continue home Synthroid and adjust dose if need be  - outpatient follow-up with PCP      Morbid obesity  -  on self-directed weight loss and need for increased physical activity      HTN (hypertension)  - BP well controlled  - Hold home losartan and HCTZ given ASHLEY  - Resume home Amlodipine pending blood pressure trends      Elevated troponin  - 12 lead EKG showed sinus tachycardia with occasional Premature ventricular complexes  - Currently denies chest pain and/or equivalent  - Initial troponin 0.031, will trend  - Tele monitoring      ASHLEY (acute kidney injury)  - Cr elevated to 2.2 (last known Cr was 1.1 in 2004)  - Hold home losartan and HCTZ for now  - Gentle IVFs  - Caution with nephrotoxic medications  - Monitor with BMP      Acute hypoxemic respiratory failure  - secondary to COVID-19 viral infection in addition to bilateral pneumonia  - ABG showed Po2 of 58 on room air  - treat pneumonia  - continue supplemental oxygen to KS >92%  - Will need home O2 eval prior to DC  - monitor      Sepsis  - Tachycardic and tachypneic  - Lactic acid 1.8  - Procalcitonin pending  - Likely secondary to COVID-19 viral infection in addition to bilateral pneumonia  - BC drawn and are pending  - Will hold off on IV fluid bolus for now as patient is hemodynamically stable  - Continue IV ABX  - Monitor      Bilateral pneumonia  - BC drawn and are pending  - will attempt to obtain sputum culture  - Given Vanc  and Zosyn in the ER, will transition to Azithromycin and Rocephin per protocol  - Albuterol inhaler PRN  - Supplemental O2 to KS >92  - Monitor         VTE Risk Mitigation (From admission, onward)         Ordered     heparin (porcine) injection 5,000 Units  Every 8 hours      07/10/20 1453     IP VTE HIGH RISK PATIENT  Once      07/10/20 1453     Place sequential compression device  Until discontinued      07/10/20 1453                      Tomer Velez MD  Department of Hospital Medicine   [facility]

## 2020-07-12 NOTE — PLAN OF CARE
Patient continues to be monitored and treated. Patient's daughter visited with patient at bedside; family involvement and support encouraged. Plan of care reviewed, and all questions answered to their satisfaction. Patient on 6 L via nasal cannula. IV fluids continue to infuse. Will continue to monitor and treat.

## 2020-07-12 NOTE — PLAN OF CARE
AAOX4  POC reviewed with pt  Prn meds given for persistent cough effective  Hypotensive at hs; asymptomatic  Monitor vs and pt  Declined Remdesivir  Dexamethasone/Azithromycin/Rocephin treatments cont'd  Voids to bsc  NS @ 75 ml/hr to 20G RFA

## 2020-07-12 NOTE — ASSESSMENT & PLAN NOTE
- COVID-19 testing performed on 7/7 at [facility] and reported as POSITIVE; rapid today is negative, however, clinically, patient presents with Covid  - Infection Control notified     - Placed in OBS  - Remdesivir fact she given to patient to review, and at this time she is refusing treatment.  Will revisit if patient changes her mind, or if she becomes more symptomatic.  - Will start on Dexamethasone 6 mg po daily  - BC drawn and are pending  - continue empiric antibiotics  - Inflammatory markers, including D dimer pending      - Isolation:   - Airborne, Contact and Droplet Precautions  - Cohort patients into COVID units  - N95 mask, wear eye protection  - 20 second hand hygiene              - Limit visitors per hospital policy              - Consolidating lab draws, nursing care, provider visits, and interventions    - Diagnostics: (leukopenia, hyponatremia, hyperferritinemia, elevated troponin, elevated d-dimer, age, and comorbidities are significant predictors of poor clinical outcome)  CBC, CMP, Procalcitonin, Ferritin, CRP, LDH, Troponin, ECG, Portable CXR and UA and culture    - Management:  Supplemental O2 to maintain SpO2 >92%  Telemetry  Continuous/intermittent Pulse Ox  Albuterol treatment PRN    Advance Care Planning   Full Code

## 2020-07-13 PROCEDURE — 21400001 HC TELEMETRY ROOM

## 2020-07-13 PROCEDURE — 25000003 PHARM REV CODE 250: Performed by: INTERNAL MEDICINE

## 2020-07-13 PROCEDURE — 99900035 HC TECH TIME PER 15 MIN (STAT)

## 2020-07-13 PROCEDURE — 96372 THER/PROPH/DIAG INJ SC/IM: CPT

## 2020-07-13 PROCEDURE — 94761 N-INVAS EAR/PLS OXIMETRY MLT: CPT

## 2020-07-13 PROCEDURE — 86140 C-REACTIVE PROTEIN: CPT

## 2020-07-13 PROCEDURE — 94640 AIRWAY INHALATION TREATMENT: CPT

## 2020-07-13 PROCEDURE — 63700000 PHARM REV CODE 250 ALT 637 W/O HCPCS: Performed by: NURSE PRACTITIONER

## 2020-07-13 PROCEDURE — 84100 ASSAY OF PHOSPHORUS: CPT

## 2020-07-13 PROCEDURE — 27100171 HC OXYGEN HIGH FLOW UP TO 24 HOURS

## 2020-07-13 PROCEDURE — 83735 ASSAY OF MAGNESIUM: CPT

## 2020-07-13 PROCEDURE — 36415 COLL VENOUS BLD VENIPUNCTURE: CPT

## 2020-07-13 PROCEDURE — 63600175 PHARM REV CODE 636 W HCPCS: Performed by: NURSE PRACTITIONER

## 2020-07-13 PROCEDURE — 25000003 PHARM REV CODE 250: Performed by: NURSE PRACTITIONER

## 2020-07-13 PROCEDURE — 80053 COMPREHEN METABOLIC PANEL: CPT

## 2020-07-13 RX ADMIN — ALBUTEROL SULFATE 2 PUFF: 90 AEROSOL, METERED RESPIRATORY (INHALATION) at 10:07

## 2020-07-13 RX ADMIN — HEPARIN SODIUM 5000 UNITS: 5000 INJECTION, SOLUTION INTRAVENOUS; SUBCUTANEOUS at 02:07

## 2020-07-13 RX ADMIN — LEVOTHYROXINE SODIUM 75 MCG: 75 TABLET ORAL at 05:07

## 2020-07-13 RX ADMIN — SODIUM CHLORIDE 1000 ML: 0.9 INJECTION, SOLUTION INTRAVENOUS at 03:07

## 2020-07-13 RX ADMIN — PROMETHAZINE HYDROCHLORIDE AND CODEINE PHOSPHATE 5 ML: 6.25; 1 SOLUTION ORAL at 12:07

## 2020-07-13 RX ADMIN — DEXAMETHASONE 6 MG: 4 TABLET ORAL at 09:07

## 2020-07-13 RX ADMIN — SODIUM CHLORIDE 100 MG: 9 INJECTION, SOLUTION INTRAVENOUS at 04:07

## 2020-07-13 RX ADMIN — PROMETHAZINE HYDROCHLORIDE AND CODEINE PHOSPHATE 5 ML: 6.25; 1 SOLUTION ORAL at 04:07

## 2020-07-13 RX ADMIN — ASPIRIN 81 MG: 81 TABLET, COATED ORAL at 09:07

## 2020-07-13 RX ADMIN — HEPARIN SODIUM 5000 UNITS: 5000 INJECTION, SOLUTION INTRAVENOUS; SUBCUTANEOUS at 05:07

## 2020-07-13 RX ADMIN — BENZONATATE 100 MG: 100 CAPSULE ORAL at 08:07

## 2020-07-13 RX ADMIN — PROMETHAZINE HYDROCHLORIDE AND CODEINE PHOSPHATE 5 ML: 6.25; 1 SOLUTION ORAL at 08:07

## 2020-07-13 RX ADMIN — CEFTRIAXONE 1 G: 1 INJECTION, SOLUTION INTRAVENOUS at 08:07

## 2020-07-13 RX ADMIN — HEPARIN SODIUM 5000 UNITS: 5000 INJECTION, SOLUTION INTRAVENOUS; SUBCUTANEOUS at 09:07

## 2020-07-13 RX ADMIN — PROMETHAZINE HYDROCHLORIDE AND CODEINE PHOSPHATE 5 ML: 6.25; 1 SOLUTION ORAL at 10:07

## 2020-07-13 RX ADMIN — PANTOPRAZOLE SODIUM 40 MG: 40 TABLET, DELAYED RELEASE ORAL at 09:07

## 2020-07-13 RX ADMIN — ALBUTEROL SULFATE 2 PUFF: 90 AEROSOL, METERED RESPIRATORY (INHALATION) at 08:07

## 2020-07-13 RX ADMIN — AZITHROMYCIN MONOHYDRATE 250 MG: 250 TABLET ORAL at 09:07

## 2020-07-13 RX ADMIN — ALBUTEROL SULFATE 2 PUFF: 90 AEROSOL, METERED RESPIRATORY (INHALATION) at 03:07

## 2020-07-13 RX ADMIN — ALBUTEROL SULFATE 2 PUFF: 90 AEROSOL, METERED RESPIRATORY (INHALATION) at 12:07

## 2020-07-13 NOTE — PLAN OF CARE
AAOx4, VSS.  POC reviewed,verbalized understanding  Continuous tele monitoring; NSR  Continuous pulse ox; 94-95% on vapotherm at rest; SOB with activity.  20g PIV to RFA; NS infusing at 75 mL/h.   Skin free of breakdown.   Free of falls.   Safety precautions maintained.  Will continue to monitor

## 2020-07-13 NOTE — NURSING
Pt c/o throat discomfort; ice & chloraseptic given. Stated effective.  Entered room pt sitting on the side of bed SOB. O2 sat 83%. Pt laid back down refused prone position. O2 sat 86-88%. Notified On-call & respiratory. New order received for vapotherm.

## 2020-07-13 NOTE — PLAN OF CARE
AOx4. POC reviewed. Updated white board. Able to verbalize understanding, needs & follow commands. VSS. Tele-monitoring continues. 20g PIV to RFA. Receiving NS 75 mL/h. Skin free of breakdown. C/O abdominal & back pain r/t coughing. NADN at this time. Resting quietly in bed. One-person assist. Hourly rounding complete. All safety measures remain in place. Bed alarm on & audible. Free of falls. SR up x2; bed low & locked. Call light w/in reach. Will continue to monitor throughout shift.

## 2020-07-14 LAB
ALBUMIN SERPL BCP-MCNC: 2.9 G/DL (ref 3.5–5.2)
ALP SERPL-CCNC: 71 U/L (ref 55–135)
ALT SERPL W/O P-5'-P-CCNC: 48 U/L (ref 10–44)
ANION GAP SERPL CALC-SCNC: 12 MMOL/L (ref 8–16)
AST SERPL-CCNC: 33 U/L (ref 10–40)
BASOPHILS # BLD AUTO: 0.01 K/UL (ref 0–0.2)
BASOPHILS # BLD AUTO: 0.01 K/UL (ref 0–0.2)
BASOPHILS NFR BLD: 0.1 % (ref 0–1.9)
BASOPHILS NFR BLD: 0.1 % (ref 0–1.9)
BILIRUB SERPL-MCNC: 0.2 MG/DL (ref 0.1–1)
BUN SERPL-MCNC: 16 MG/DL (ref 6–20)
CALCIUM SERPL-MCNC: 8.9 MG/DL (ref 8.7–10.5)
CHLORIDE SERPL-SCNC: 112 MMOL/L (ref 95–110)
CO2 SERPL-SCNC: 18 MMOL/L (ref 23–29)
CREAT SERPL-MCNC: 1.2 MG/DL (ref 0.5–1.4)
CRP SERPL-MCNC: 46.9 MG/L (ref 0–8.2)
DIFFERENTIAL METHOD: ABNORMAL
DIFFERENTIAL METHOD: ABNORMAL
EOSINOPHIL # BLD AUTO: 0 K/UL (ref 0–0.5)
EOSINOPHIL # BLD AUTO: 0 K/UL (ref 0–0.5)
EOSINOPHIL NFR BLD: 0 % (ref 0–8)
EOSINOPHIL NFR BLD: 0 % (ref 0–8)
ERYTHROCYTE [DISTWIDTH] IN BLOOD BY AUTOMATED COUNT: 15 % (ref 11.5–14.5)
ERYTHROCYTE [DISTWIDTH] IN BLOOD BY AUTOMATED COUNT: 15.1 % (ref 11.5–14.5)
EST. GFR  (AFRICAN AMERICAN): 58 ML/MIN/1.73 M^2
EST. GFR  (NON AFRICAN AMERICAN): 51 ML/MIN/1.73 M^2
GLUCOSE SERPL-MCNC: 121 MG/DL (ref 70–110)
HCT VFR BLD AUTO: 37.2 % (ref 37–48.5)
HCT VFR BLD AUTO: 37.5 % (ref 37–48.5)
HGB BLD-MCNC: 11.8 G/DL (ref 12–16)
HGB BLD-MCNC: 12.1 G/DL (ref 12–16)
IMM GRANULOCYTES # BLD AUTO: 0.2 K/UL (ref 0–0.04)
IMM GRANULOCYTES # BLD AUTO: 0.28 K/UL (ref 0–0.04)
IMM GRANULOCYTES NFR BLD AUTO: 2.4 % (ref 0–0.5)
IMM GRANULOCYTES NFR BLD AUTO: 3.3 % (ref 0–0.5)
LYMPHOCYTES # BLD AUTO: 1.1 K/UL (ref 1–4.8)
LYMPHOCYTES # BLD AUTO: 1.2 K/UL (ref 1–4.8)
LYMPHOCYTES NFR BLD: 13.3 % (ref 18–48)
LYMPHOCYTES NFR BLD: 14.3 % (ref 18–48)
MAGNESIUM SERPL-MCNC: 1.7 MG/DL (ref 1.6–2.6)
MCH RBC QN AUTO: 26.8 PG (ref 27–31)
MCH RBC QN AUTO: 27.4 PG (ref 27–31)
MCHC RBC AUTO-ENTMCNC: 31.7 G/DL (ref 32–36)
MCHC RBC AUTO-ENTMCNC: 32.3 G/DL (ref 32–36)
MCV RBC AUTO: 84 FL (ref 82–98)
MCV RBC AUTO: 85 FL (ref 82–98)
MONOCYTES # BLD AUTO: 0.5 K/UL (ref 0.3–1)
MONOCYTES # BLD AUTO: 0.9 K/UL (ref 0.3–1)
MONOCYTES NFR BLD: 10.2 % (ref 4–15)
MONOCYTES NFR BLD: 5.9 % (ref 4–15)
NEUTROPHILS # BLD AUTO: 6.2 K/UL (ref 1.8–7.7)
NEUTROPHILS # BLD AUTO: 6.5 K/UL (ref 1.8–7.7)
NEUTROPHILS NFR BLD: 72.1 % (ref 38–73)
NEUTROPHILS NFR BLD: 78.3 % (ref 38–73)
NRBC BLD-RTO: 0 /100 WBC
NRBC BLD-RTO: 0 /100 WBC
PHOSPHATE SERPL-MCNC: 3.1 MG/DL (ref 2.7–4.5)
PLATELET # BLD AUTO: 354 K/UL (ref 150–350)
PLATELET # BLD AUTO: 366 K/UL (ref 150–350)
PMV BLD AUTO: 11 FL (ref 9.2–12.9)
PMV BLD AUTO: 11.2 FL (ref 9.2–12.9)
POTASSIUM SERPL-SCNC: 3.7 MMOL/L (ref 3.5–5.1)
PROT SERPL-MCNC: 6.4 G/DL (ref 6–8.4)
RBC # BLD AUTO: 4.41 M/UL (ref 4–5.4)
RBC # BLD AUTO: 4.41 M/UL (ref 4–5.4)
SODIUM SERPL-SCNC: 142 MMOL/L (ref 136–145)
WBC # BLD AUTO: 8.25 K/UL (ref 3.9–12.7)
WBC # BLD AUTO: 8.54 K/UL (ref 3.9–12.7)

## 2020-07-14 PROCEDURE — 25000003 PHARM REV CODE 250: Performed by: INTERNAL MEDICINE

## 2020-07-14 PROCEDURE — 94761 N-INVAS EAR/PLS OXIMETRY MLT: CPT

## 2020-07-14 PROCEDURE — 85025 COMPLETE CBC W/AUTO DIFF WBC: CPT | Mod: 91

## 2020-07-14 PROCEDURE — 63600175 PHARM REV CODE 636 W HCPCS: Performed by: NURSE PRACTITIONER

## 2020-07-14 PROCEDURE — 63700000 PHARM REV CODE 250 ALT 637 W/O HCPCS: Performed by: NURSE PRACTITIONER

## 2020-07-14 PROCEDURE — 27100171 HC OXYGEN HIGH FLOW UP TO 24 HOURS

## 2020-07-14 PROCEDURE — 94640 AIRWAY INHALATION TREATMENT: CPT

## 2020-07-14 PROCEDURE — 25000003 PHARM REV CODE 250: Performed by: NURSE PRACTITIONER

## 2020-07-14 PROCEDURE — 96372 THER/PROPH/DIAG INJ SC/IM: CPT

## 2020-07-14 PROCEDURE — 94799 UNLISTED PULMONARY SVC/PX: CPT

## 2020-07-14 PROCEDURE — 36415 COLL VENOUS BLD VENIPUNCTURE: CPT

## 2020-07-14 PROCEDURE — 99900035 HC TECH TIME PER 15 MIN (STAT)

## 2020-07-14 PROCEDURE — 21400001 HC TELEMETRY ROOM

## 2020-07-14 RX ORDER — PANTOPRAZOLE SODIUM 40 MG/1
40 TABLET, DELAYED RELEASE ORAL DAILY
Status: DISCONTINUED | OUTPATIENT
Start: 2020-07-15 | End: 2020-07-16 | Stop reason: HOSPADM

## 2020-07-14 RX ORDER — PANTOPRAZOLE SODIUM 40 MG/10ML
40 INJECTION, POWDER, LYOPHILIZED, FOR SOLUTION INTRAVENOUS 2 TIMES DAILY
Status: DISCONTINUED | OUTPATIENT
Start: 2020-07-14 | End: 2020-07-14

## 2020-07-14 RX ADMIN — HEPARIN SODIUM 5000 UNITS: 5000 INJECTION, SOLUTION INTRAVENOUS; SUBCUTANEOUS at 05:07

## 2020-07-14 RX ADMIN — PROMETHAZINE HYDROCHLORIDE AND CODEINE PHOSPHATE 5 ML: 6.25; 1 SOLUTION ORAL at 08:07

## 2020-07-14 RX ADMIN — HEPARIN SODIUM 5000 UNITS: 5000 INJECTION, SOLUTION INTRAVENOUS; SUBCUTANEOUS at 02:07

## 2020-07-14 RX ADMIN — ALBUTEROL SULFATE 2 PUFF: 90 AEROSOL, METERED RESPIRATORY (INHALATION) at 04:07

## 2020-07-14 RX ADMIN — ACETAMINOPHEN 650 MG: 325 TABLET ORAL at 03:07

## 2020-07-14 RX ADMIN — LEVOTHYROXINE SODIUM 75 MCG: 75 TABLET ORAL at 05:07

## 2020-07-14 RX ADMIN — BENZONATATE 100 MG: 100 CAPSULE ORAL at 08:07

## 2020-07-14 RX ADMIN — PROMETHAZINE HYDROCHLORIDE AND CODEINE PHOSPHATE 5 ML: 6.25; 1 SOLUTION ORAL at 02:07

## 2020-07-14 RX ADMIN — BENZONATATE 100 MG: 100 CAPSULE ORAL at 03:07

## 2020-07-14 RX ADMIN — SODIUM CHLORIDE: 0.9 INJECTION, SOLUTION INTRAVENOUS at 09:07

## 2020-07-14 RX ADMIN — SODIUM CHLORIDE: 0.9 INJECTION, SOLUTION INTRAVENOUS at 08:07

## 2020-07-14 RX ADMIN — CEFTRIAXONE 1 G: 1 INJECTION, SOLUTION INTRAVENOUS at 08:07

## 2020-07-14 RX ADMIN — PROMETHAZINE HYDROCHLORIDE AND CODEINE PHOSPHATE 5 ML: 6.25; 1 SOLUTION ORAL at 03:07

## 2020-07-14 RX ADMIN — ALBUTEROL SULFATE 2 PUFF: 90 AEROSOL, METERED RESPIRATORY (INHALATION) at 11:07

## 2020-07-14 RX ADMIN — SODIUM CHLORIDE 100 MG: 9 INJECTION, SOLUTION INTRAVENOUS at 04:07

## 2020-07-14 RX ADMIN — ALBUTEROL SULFATE 2 PUFF: 90 AEROSOL, METERED RESPIRATORY (INHALATION) at 07:07

## 2020-07-14 RX ADMIN — HEPARIN SODIUM 5000 UNITS: 5000 INJECTION, SOLUTION INTRAVENOUS; SUBCUTANEOUS at 09:07

## 2020-07-14 RX ADMIN — ASPIRIN 81 MG: 81 TABLET, COATED ORAL at 08:07

## 2020-07-14 RX ADMIN — PANTOPRAZOLE SODIUM 40 MG: 40 TABLET, DELAYED RELEASE ORAL at 08:07

## 2020-07-14 RX ADMIN — AZITHROMYCIN MONOHYDRATE 250 MG: 250 TABLET ORAL at 08:07

## 2020-07-14 RX ADMIN — DEXAMETHASONE 6 MG: 4 TABLET ORAL at 08:07

## 2020-07-14 NOTE — PROGRESS NOTES
[facility] Hospital Medicine  Progress Note    Patient Name: Barbara Weir  MRN: [id number]  Patient Class: IP- Inpatient   Admission Date: 7/10/2020  Length of Stay: 3 days  Attending Physician: Tomer Velez MD  Primary Care Provider: Daniella Samson MD        Subjective:     Principal Problem:COVID-19 virus infection        HPI:  Barbara Weir is a 55 y.o. AAF with a PMHx of Morbid obesity, Anemia, and HTN, who presented to the Emergency Department today for evaluation of worsening SOB, which onset a few days ago.  Symptoms are constant and moderate in severity.  Of note, patient reportedly tested positive for COVID-19 on 7/7/20 (test obtained at [facility]), but repeat rapid test today noted to be negative.  Patient's  also tested positive and was admitted today.  No mitigating or exacerbating factors reported.  Associated sxs include SOB, myalgias, cough, nausea, subjective fever, chills, and fatigue. Patient denies any CP, vomiting, diarrhea, dysuria, HA, and all other sxs at this time.  No prior Tx reported.  No further complaints or concerns at this time.  ER workup showed; tachycardia and tachypnea.  SaO2 on nasal cannula 93%.  CBC showed WBCs of 3.59, otherwise unremarkable.  CMP showed creatinine 2.2, CO2 19, AST 43, otherwise unremarkable.  BNP less than 10.  .  Lactic acid 1.8.  ABG showed; ph 7.447, Pco2 21.2, HCO3 14.6, Po2 58 on RA.  CXR showed a moderate amount of alveolar consolidation scattered throughout the lower 2/3 of both lungs.  This is consistent with the patient's history and characteristic of pneumonia.  The size of the heart is prominent.  Some of this may be secondary to magnification.  BC were drawn and patient was given Vanc and Zosyn in the ER.  Hospital Medicine contacted for admission.  Patient will be placed in observation.  She is a full code.  Her SDM is her daughter Charley who can be reached at [phone].    Overview/Hospital  Course:  No notes on file    Interval History:  Varying respiratory distress.  Worsening shortness of breath with ambulation.  On high flow nasal cannula.  Dry cough  No chest pain.  No nausea or vomiting.    Review of Systems   Constitutional: Negative for chills and fever.   HENT: Negative for congestion and sore throat.    Eyes: Negative for visual disturbance.   Respiratory: Positive for cough and shortness of breath. Negative for wheezing.    Cardiovascular: Negative for chest pain, palpitations and leg swelling.   Gastrointestinal: Negative for abdominal pain, blood in stool, constipation, diarrhea, nausea and vomiting.   Genitourinary: Negative for dysuria and hematuria.   Musculoskeletal: Negative for arthralgias and back pain.   Skin: Negative for rash and wound.   Neurological: Negative for dizziness, weakness, light-headedness and numbness.   Hematological: Negative for adenopathy.     Objective:     Vital Signs (Most Recent):  Temp: 98.6 °F (37 °C) (07/13/20 1907)  Pulse: 79 (07/13/20 1907)  Resp: (!) 24 (07/13/20 1907)  BP: 123/75 (07/13/20 1907)  SpO2: 95 % (07/13/20 1943) Vital Signs (24h Range):  Temp:  [98.3 °F (36.8 °C)-99.4 °F (37.4 °C)] 98.6 °F (37 °C)  Pulse:  [77-97] 79  Resp:  [18-28] 24  SpO2:  [85 %-97 %] 95 %  BP: (110-141)/(55-79) 123/75     Weight: 125 kg (275 lb 9.2 oz)  Body mass index is 45.86 kg/m².    Intake/Output Summary (Last 24 hours) at 7/13/2020 2056  Last data filed at 7/13/2020 1700  Gross per 24 hour   Intake 5451.75 ml   Output 400 ml   Net 5051.75 ml      Physical Exam  Vitals signs and nursing note reviewed.   Constitutional:       General: She is not in acute distress.     Appearance: She is well-developed.   HENT:      Head: Normocephalic and atraumatic.   Eyes:      Conjunctiva/sclera: Conjunctivae normal.      Pupils: Pupils are equal, round, and reactive to light.   Neck:      Musculoskeletal: Neck supple.      Thyroid: No thyromegaly.      Vascular: No JVD.    Cardiovascular:      Rate and Rhythm: Normal rate and regular rhythm.      Heart sounds: No murmur. No friction rub. No gallop.    Pulmonary:      Effort: Pulmonary effort is normal.      Breath sounds: Normal breath sounds. No wheezing or rales.   Abdominal:      General: Bowel sounds are normal. There is no distension.      Palpations: Abdomen is soft.      Tenderness: There is no abdominal tenderness. There is no guarding or rebound.   Musculoskeletal: Normal range of motion.         General: No deformity.   Lymphadenopathy:      Cervical: No cervical adenopathy.   Skin:     General: Skin is warm and dry.      Findings: No rash.   Neurological:      Mental Status: She is alert and oriented to person, place, and time.      Deep Tendon Reflexes: Reflexes are normal and symmetric.   Psychiatric:         Behavior: Behavior normal.         Thought Content: Thought content normal.         Judgment: Judgment normal.         Significant Labs: All pertinent labs within the past 24 hours have been reviewed.    Significant Imaging: I have reviewed all pertinent imaging results/findings within the past 24 hours.      Assessment/Plan:      * COVID-19 virus infection   - COVID-19 testing performed on 7/7 at [facility] and reported as POSITIVE; rapid today is negative, however, clinically, patient presents with Covid  - Infection Control notified   - Will start on Dexamethasone 6 mg po daily  - BC drawn and are pending.  Continue empiric antibiotics  - Inflammatory markers, including D dimer pending  Initiated Remdesivir treatment on 12 July.    Remdesivir FDA EUA Verbal Consent  The patient or parent/caregiver has been provided with the remdesivir Fact Sheet for Patients and Parents/Caregivers and has been counseled that the FDA has authorized the emergency use of remdesivir, which is not an FDA approved drug. The significant known and potential risks and benefits are unknown. The patient or parent/caregiver has been given the  option to accept or refuse and has verbally agreed to receive remdesivir. Daily labs will be ordered and monitoring for Serious Adverse Events will be performed.    - Isolation:   - Airborne, Contact and Droplet Precautions  - Cohort patients into COVID units  - N95 mask, wear eye protection  - 20 second hand hygiene              - Limit visitors per hospital policy              - Consolidating lab draws, nursing care, provider visits, and interventions    - Diagnostics: (leukopenia, hyponatremia, hyperferritinemia, elevated troponin, elevated d-dimer, age, and comorbidities are significant predictors of poor clinical outcome)  CBC, CMP, Procalcitonin, Ferritin, CRP, LDH, Troponin, ECG, Portable CXR and UA and culture    - Management:  Supplemental O2 to maintain SpO2 >92%  Telemetry  Continuous/intermittent Pulse Ox  Albuterol treatment PRN    Advance Care Planning   Full Code             GERD (gastroesophageal reflux disease)  - Continue Protonix      Hypothyroidism  - TSH pending  - continue home Synthroid and adjust dose if need be  - outpatient follow-up with PCP      Morbid obesity  -  on self-directed weight loss and need for increased physical activity      HTN (hypertension)  - BP well controlled  - Hold home losartan and HCTZ given ASHLEY  - Resume home Amlodipine pending blood pressure trends      Elevated troponin  - 12 lead EKG showed sinus tachycardia with occasional Premature ventricular complexes  - Currently denies chest pain and/or equivalent  - Initial troponin 0.031, will trend  - Tele monitoring      ASHLEY (acute kidney injury)  - Cr elevated to 2.2 (last known Cr was 1.1 in 2004)  - Hold home losartan and HCTZ for now  - Gentle IVFs  - Caution with nephrotoxic medications  - Monitor with BMP      Acute hypoxemic respiratory failure  - secondary to COVID-19 viral infection in addition to bilateral pneumonia  - ABG showed Po2 of 58 on room air  - treat pneumonia  - continue supplemental oxygen  to KS >92%  - Will need home O2 eval prior to DC  - monitor      Sepsis  - Tachycardic and tachypneic  - Lactic acid 1.8  - Procalcitonin pending  - Likely secondary to COVID-19 viral infection in addition to bilateral pneumonia  - BC drawn and are pending  - Will hold off on IV fluid bolus for now as patient is hemodynamically stable  - Continue IV ABX  - Monitor      Bilateral pneumonia  - BC drawn and are pending  - will attempt to obtain sputum culture  - Given Vanc and Zosyn in the ER, will transition to Azithromycin and Rocephin per protocol  - Albuterol inhaler PRN  - Supplemental O2 to KS >92  - Monitor         VTE Risk Mitigation (From admission, onward)         Ordered     heparin (porcine) injection 5,000 Units  Every 8 hours      07/10/20 1453     IP VTE HIGH RISK PATIENT  Once      07/10/20 1453     Place sequential compression device  Until discontinued      07/10/20 1453                      Tomer Velez MD  Department of Hospital Medicine   [facility]

## 2020-07-14 NOTE — PLAN OF CARE
Consult received for ltac. Contacted patient via telephone due to covid status. Discussed options for receiving ltac services. Preference letter secured for [facility] in [place]. Referral faxed to [facility]. Message sent to Robert Mccollum at [facility].          07/14/20 3455   Post-Acute Status   Post-Acute Authorization Placement   Post-Acute Placement Status Referrals Sent   Discharge Delays (!) Other

## 2020-07-14 NOTE — PLAN OF CARE
POC reviewed with patient  Family at bedside  Fall risk- bed alarm, non skid socks  Vapotherm 25 l/min, 95%FIO2- continuous pulse ox- SOB with movement  IV fluids continue  Remedsiver IV - no S/S of adverse reaction  Up to bedside commode, urine incontenance with Coughing  PRN Cough medication given with moderate relief  Rounding Completed  SR on Cardiac monitor

## 2020-07-14 NOTE — SUBJECTIVE & OBJECTIVE
Interval History:  Varying respiratory distress.  Worsening shortness of breath with ambulation.  On high flow nasal cannula.  Dry cough  No chest pain.  No nausea or vomiting.    Review of Systems   Constitutional: Negative for chills and fever.   HENT: Negative for congestion and sore throat.    Eyes: Negative for visual disturbance.   Respiratory: Positive for cough and shortness of breath. Negative for wheezing.    Cardiovascular: Negative for chest pain, palpitations and leg swelling.   Gastrointestinal: Negative for abdominal pain, blood in stool, constipation, diarrhea, nausea and vomiting.   Genitourinary: Negative for dysuria and hematuria.   Musculoskeletal: Negative for arthralgias and back pain.   Skin: Negative for rash and wound.   Neurological: Negative for dizziness, weakness, light-headedness and numbness.   Hematological: Negative for adenopathy.     Objective:     Vital Signs (Most Recent):  Temp: 98.6 °F (37 °C) (07/13/20 1907)  Pulse: 79 (07/13/20 1907)  Resp: (!) 24 (07/13/20 1907)  BP: 123/75 (07/13/20 1907)  SpO2: 95 % (07/13/20 1943) Vital Signs (24h Range):  Temp:  [98.3 °F (36.8 °C)-99.4 °F (37.4 °C)] 98.6 °F (37 °C)  Pulse:  [77-97] 79  Resp:  [18-28] 24  SpO2:  [85 %-97 %] 95 %  BP: (110-141)/(55-79) 123/75     Weight: 125 kg (275 lb 9.2 oz)  Body mass index is 45.86 kg/m².    Intake/Output Summary (Last 24 hours) at 7/13/2020 2056  Last data filed at 7/13/2020 1700  Gross per 24 hour   Intake 5451.75 ml   Output 400 ml   Net 5051.75 ml      Physical Exam  Vitals signs and nursing note reviewed.   Constitutional:       General: She is not in acute distress.     Appearance: She is well-developed.   HENT:      Head: Normocephalic and atraumatic.   Eyes:      Conjunctiva/sclera: Conjunctivae normal.      Pupils: Pupils are equal, round, and reactive to light.   Neck:      Musculoskeletal: Neck supple.      Thyroid: No thyromegaly.      Vascular: No JVD.   Cardiovascular:      Rate and  Rhythm: Normal rate and regular rhythm.      Heart sounds: No murmur. No friction rub. No gallop.    Pulmonary:      Effort: Pulmonary effort is normal.      Breath sounds: Normal breath sounds. No wheezing or rales.   Abdominal:      General: Bowel sounds are normal. There is no distension.      Palpations: Abdomen is soft.      Tenderness: There is no abdominal tenderness. There is no guarding or rebound.   Musculoskeletal: Normal range of motion.         General: No deformity.   Lymphadenopathy:      Cervical: No cervical adenopathy.   Skin:     General: Skin is warm and dry.      Findings: No rash.   Neurological:      Mental Status: She is alert and oriented to person, place, and time.      Deep Tendon Reflexes: Reflexes are normal and symmetric.   Psychiatric:         Behavior: Behavior normal.         Thought Content: Thought content normal.         Judgment: Judgment normal.         Significant Labs: All pertinent labs within the past 24 hours have been reviewed.    Significant Imaging: I have reviewed all pertinent imaging results/findings within the past 24 hours.

## 2020-07-14 NOTE — ASSESSMENT & PLAN NOTE
- COVID-19 testing performed on 7/7 at [facility] and reported as POSITIVE; rapid today is negative, however, clinically, patient presents with Covid  - Infection Control notified   - Will start on Dexamethasone 6 mg po daily  - BC drawn and are pending.  Continue empiric antibiotics  - Inflammatory markers, including D dimer pending  Initiated Remdesivir treatment on 12 July.    Remdesivir FDA EUA Verbal Consent  The patient or parent/caregiver has been provided with the remdesivir Fact Sheet for Patients and Parents/Caregivers and has been counseled that the FDA has authorized the emergency use of remdesivir, which is not an FDA approved drug. The significant known and potential risks and benefits are unknown. The patient or parent/caregiver has been given the option to accept or refuse and has verbally agreed to receive remdesivir. Daily labs will be ordered and monitoring for Serious Adverse Events will be performed.    - Isolation:   - Airborne, Contact and Droplet Precautions  - Cohort patients into COVID units  - N95 mask, wear eye protection  - 20 second hand hygiene              - Limit visitors per hospital policy              - Consolidating lab draws, nursing care, provider visits, and interventions    - Diagnostics: (leukopenia, hyponatremia, hyperferritinemia, elevated troponin, elevated d-dimer, age, and comorbidities are significant predictors of poor clinical outcome)  CBC, CMP, Procalcitonin, Ferritin, CRP, LDH, Troponin, ECG, Portable CXR and UA and culture    - Management:  Supplemental O2 to maintain SpO2 >92%  Telemetry  Continuous/intermittent Pulse Ox  Albuterol treatment PRN    Advance Care Planning   Full Code

## 2020-07-15 LAB
BACTERIA BLD CULT: NORMAL
BACTERIA BLD CULT: NORMAL

## 2020-07-15 PROCEDURE — 94640 AIRWAY INHALATION TREATMENT: CPT

## 2020-07-15 PROCEDURE — 96372 THER/PROPH/DIAG INJ SC/IM: CPT

## 2020-07-15 PROCEDURE — 94761 N-INVAS EAR/PLS OXIMETRY MLT: CPT

## 2020-07-15 PROCEDURE — 94799 UNLISTED PULMONARY SVC/PX: CPT

## 2020-07-15 PROCEDURE — 63600175 PHARM REV CODE 636 W HCPCS: Performed by: NURSE PRACTITIONER

## 2020-07-15 PROCEDURE — 36415 COLL VENOUS BLD VENIPUNCTURE: CPT

## 2020-07-15 PROCEDURE — 94760 N-INVAS EAR/PLS OXIMETRY 1: CPT

## 2020-07-15 PROCEDURE — 84100 ASSAY OF PHOSPHORUS: CPT

## 2020-07-15 PROCEDURE — 25000003 PHARM REV CODE 250: Performed by: NURSE PRACTITIONER

## 2020-07-15 PROCEDURE — 25000003 PHARM REV CODE 250: Performed by: INTERNAL MEDICINE

## 2020-07-15 PROCEDURE — 80053 COMPREHEN METABOLIC PANEL: CPT

## 2020-07-15 PROCEDURE — 99900035 HC TECH TIME PER 15 MIN (STAT)

## 2020-07-15 PROCEDURE — 21400001 HC TELEMETRY ROOM

## 2020-07-15 PROCEDURE — 27100171 HC OXYGEN HIGH FLOW UP TO 24 HOURS

## 2020-07-15 PROCEDURE — 86140 C-REACTIVE PROTEIN: CPT

## 2020-07-15 PROCEDURE — 83735 ASSAY OF MAGNESIUM: CPT

## 2020-07-15 RX ORDER — DIPHENHYDRAMINE HCL 25 MG
25 CAPSULE ORAL NIGHTLY PRN
Status: DISCONTINUED | OUTPATIENT
Start: 2020-07-15 | End: 2020-07-16 | Stop reason: HOSPADM

## 2020-07-15 RX ADMIN — LEVOTHYROXINE SODIUM 75 MCG: 75 TABLET ORAL at 05:07

## 2020-07-15 RX ADMIN — ALBUTEROL SULFATE 2 PUFF: 90 AEROSOL, METERED RESPIRATORY (INHALATION) at 03:07

## 2020-07-15 RX ADMIN — PANTOPRAZOLE SODIUM 40 MG: 40 TABLET, DELAYED RELEASE ORAL at 08:07

## 2020-07-15 RX ADMIN — PROMETHAZINE HYDROCHLORIDE AND CODEINE PHOSPHATE 5 ML: 6.25; 1 SOLUTION ORAL at 09:07

## 2020-07-15 RX ADMIN — DEXAMETHASONE 6 MG: 4 TABLET ORAL at 08:07

## 2020-07-15 RX ADMIN — SODIUM CHLORIDE 100 MG: 9 INJECTION, SOLUTION INTRAVENOUS at 04:07

## 2020-07-15 RX ADMIN — PROMETHAZINE HYDROCHLORIDE AND CODEINE PHOSPHATE 5 ML: 6.25; 1 SOLUTION ORAL at 01:07

## 2020-07-15 RX ADMIN — PROMETHAZINE HYDROCHLORIDE AND CODEINE PHOSPHATE 5 ML: 6.25; 1 SOLUTION ORAL at 03:07

## 2020-07-15 RX ADMIN — ALBUTEROL SULFATE 2 PUFF: 90 AEROSOL, METERED RESPIRATORY (INHALATION) at 07:07

## 2020-07-15 RX ADMIN — ACETAMINOPHEN 650 MG: 325 TABLET ORAL at 03:07

## 2020-07-15 RX ADMIN — BENZONATATE 100 MG: 100 CAPSULE ORAL at 01:07

## 2020-07-15 NOTE — ASSESSMENT & PLAN NOTE
- COVID-19 testing performed on 7/7 at [facility] and reported as POSITIVE; rapid today is negative, however, clinically, patient presents with Covid  - Infection Control notified   - Will start on Dexamethasone 6 mg po daily  - BC drawn and are pending.  Continue empiric antibiotics  - Inflammatory markers, including D dimer pending  Initiated Remdesivir treatment on 12 July.    Remdesivir FDA EUA Verbal Consent  The patient or parent/caregiver has been provided with the remdesivir Fact Sheet for Patients and Parents/Caregivers and has been counseled that the FDA has authorized the emergency use of remdesivir, which is not an FDA approved drug. The significant known and potential risks and benefits are unknown. The patient or parent/caregiver has been given the option to accept or refuse and has verbally agreed to receive remdesivir. Daily labs will be ordered and monitoring for Serious Adverse Events will be performed.    - Isolation:   - Airborne, Contact and Droplet Precautions  - Cohort patients into COVID units  - N95 mask, wear eye protection  - 20 second hand hygiene              - Limit visitors per hospital policy              - Consolidating lab draws, nursing care, provider visits, and interventions    - Diagnostics: (leukopenia, hyponatremia, hyperferritinemia, elevated troponin, elevated d-dimer, age, and comorbidities are significant predictors of poor clinical outcome)  CBC, CMP, Procalcitonin, Ferritin, CRP, LDH, Troponin, ECG, Portable CXR and UA and culture    - Management:  Supplemental O2 to maintain SpO2 >92%  Telemetry  Continuous/intermittent Pulse Ox  Albuterol treatment PRN    Advance Care Planning   Full Code

## 2020-07-15 NOTE — NURSING
"Order for IS requested via secure chat. On-call provider. MD Lida's response "yes please". Pt educated on proper use; was able to return demonstration. Reached level 1000 x1 & level 500 x5. Tolerated well.    Second secure chat sent stating, Pt had BM w/ soft stool & bleeding. There was blood in BSC & moderate amount on wipe. Pt stated she has hx of hemorrhoids. Will continue to monitor. LACHELLE Phillips responded "thanks".  "

## 2020-07-15 NOTE — PROGRESS NOTES
[facility] Hospital Medicine  Progress Note    Patient Name: Barbara Weir  MRN: [id number]  Patient Class: IP- Inpatient   Admission Date: 7/10/2020  Length of Stay: 4 days  Attending Physician: Tomer Velez MD  Primary Care Provider: Daniella Samson MD        Subjective:     Principal Problem:COVID-19 virus infection    HPI:  Barbara Weir is a 55 y.o. AAF with a PMHx of Morbid obesity, Anemia, and HTN, who presented to the Emergency Department today for evaluation of worsening SOB, which onset a few days ago.  Symptoms are constant and moderate in severity.  Of note, patient reportedly tested positive for COVID-19 on 7/7/20 (test obtained at [facility]), but repeat rapid test today noted to be negative.  Patient's  also tested positive and was admitted today.  No mitigating or exacerbating factors reported.  Associated sxs include SOB, myalgias, cough, nausea, subjective fever, chills, and fatigue. Patient denies any CP, vomiting, diarrhea, dysuria, HA, and all other sxs at this time.  No prior Tx reported.  No further complaints or concerns at this time.  ER workup showed; tachycardia and tachypnea.  SaO2 on nasal cannula 93%.  CBC showed WBCs of 3.59, otherwise unremarkable.  CMP showed creatinine 2.2, CO2 19, AST 43, otherwise unremarkable.  BNP less than 10.  .  Lactic acid 1.8.  ABG showed; ph 7.447, Pco2 21.2, HCO3 14.6, Po2 58 on RA.  CXR showed a moderate amount of alveolar consolidation scattered throughout the lower 2/3 of both lungs.  This is consistent with the patient's history and characteristic of pneumonia.  The size of the heart is prominent.  Some of this may be secondary to magnification.  BC were drawn and patient was given Vanc and Zosyn in the ER.  Hospital Medicine contacted for admission.  Patient will be placed in observation.  She is a full code.  Her SDM is her daughter Charley who can be reached at [phone].    Overview/Hospital  Course:  No notes on file    Interval History:  No acute problems overnight. Desaturation and increased shortness of breath with ambulation. Boarding normally and tolerating regular meals. Unable to lay prone due to discomfort. Respiratory stable on vapor therm 25 L per minute. Oxygen saturation 94% at rest. Chief complaint is severe persistent nonproductive cough.    Review of Systems   Constitutional: Negative for chills and fever.   HENT: Negative for congestion and sore throat.    Eyes: Negative for visual disturbance.   Respiratory: Positive for cough and shortness of breath. Negative for wheezing.    Cardiovascular: Negative for chest pain, palpitations and leg swelling.   Gastrointestinal: Negative for abdominal pain, blood in stool, constipation, diarrhea, nausea and vomiting.   Genitourinary: Negative for dysuria and hematuria.   Musculoskeletal: Negative for arthralgias and back pain.   Skin: Negative for rash and wound.   Neurological: Negative for dizziness, weakness, light-headedness and numbness.   Hematological: Negative for adenopathy.     Objective:     Vital Signs (Most Recent):  Temp: 97.8 °F (36.6 °C) (07/14/20 1938)  Pulse: 78 (07/14/20 1938)  Resp: (!) 24 (07/14/20 2022)  BP: 130/80 (07/14/20 1938)  SpO2: 95 % (07/14/20 2048) Vital Signs (24h Range):  Temp:  [97 °F (36.1 °C)-98.5 °F (36.9 °C)] 97.8 °F (36.6 °C)  Pulse:  [73-88] 78  Resp:  [16-24] 24  SpO2:  [88 %-98 %] 95 %  BP: (123-149)/(63-80) 130/80     Weight: 126.3 kg (278 lb 7.1 oz)  Body mass index is 46.34 kg/m².    Intake/Output Summary (Last 24 hours) at 7/14/2020 2055  Last data filed at 7/14/2020 1709  Gross per 24 hour   Intake 4670 ml   Output 300 ml   Net 4370 ml      Physical Exam  Vitals signs and nursing note reviewed.   Constitutional:       General: She is not in acute distress.     Appearance: She is well-developed.   HENT:      Head: Normocephalic and atraumatic.   Eyes:      Conjunctiva/sclera: Conjunctivae normal.       Pupils: Pupils are equal, round, and reactive to light.   Neck:      Musculoskeletal: Neck supple.      Thyroid: No thyromegaly.      Vascular: No JVD.   Cardiovascular:      Rate and Rhythm: Normal rate and regular rhythm.      Heart sounds: No murmur. No friction rub. No gallop.    Pulmonary:      Effort: Pulmonary effort is normal.      Breath sounds: Normal breath sounds. No wheezing or rales.   Abdominal:      General: Bowel sounds are normal. There is no distension.      Palpations: Abdomen is soft.      Tenderness: There is no abdominal tenderness. There is no guarding or rebound.   Musculoskeletal: Normal range of motion.         General: No deformity.   Lymphadenopathy:      Cervical: No cervical adenopathy.   Skin:     General: Skin is warm and dry.      Findings: No rash.   Neurological:      Mental Status: She is alert and oriented to person, place, and time.      Deep Tendon Reflexes: Reflexes are normal and symmetric.   Psychiatric:         Behavior: Behavior normal.         Thought Content: Thought content normal.         Judgment: Judgment normal.         Significant Labs: All pertinent labs within the past 24 hours have been reviewed.    Significant Imaging: I have reviewed all pertinent imaging results/findings within the past 24 hours.      Assessment/Plan:      * COVID-19 virus infection   - COVID-19 testing performed on 7/7 at [facility] and reported as POSITIVE; rapid today is negative, however, clinically, patient presents with Covid  - Infection Control notified   - Will start on Dexamethasone 6 mg po daily  - BC drawn and are pending.  Continue empiric antibiotics  - Inflammatory markers, including D dimer pending  Initiated Remdesivir treatment on 12 July.    Remdesivir FDA EUA Verbal Consent  The patient or parent/caregiver has been provided with the remdesivir Fact Sheet for Patients and Parents/Caregivers and has been counseled that the FDA has authorized the emergency use of remdesivir, which  is not an FDA approved drug. The significant known and potential risks and benefits are unknown. The patient or parent/caregiver has been given the option to accept or refuse and has verbally agreed to receive remdesivir. Daily labs will be ordered and monitoring for Serious Adverse Events will be performed.    - Isolation:   - Airborne, Contact and Droplet Precautions  - Cohort patients into COVID units  - N95 mask, wear eye protection  - 20 second hand hygiene              - Limit visitors per hospital policy              - Consolidating lab draws, nursing care, provider visits, and interventions    - Diagnostics: (leukopenia, hyponatremia, hyperferritinemia, elevated troponin, elevated d-dimer, age, and comorbidities are significant predictors of poor clinical outcome)  CBC, CMP, Procalcitonin, Ferritin, CRP, LDH, Troponin, ECG, Portable CXR and UA and culture    - Management:  Supplemental O2 to maintain SpO2 >92%  Telemetry  Continuous/intermittent Pulse Ox  Albuterol treatment PRN    Advance Care Planning   Full Code             GERD (gastroesophageal reflux disease)  - Continue Protonix      Hypothyroidism  - TSH pending  - continue home Synthroid and adjust dose if need be  - outpatient follow-up with PCP      Morbid obesity  -  on self-directed weight loss and need for increased physical activity      HTN (hypertension)  - BP well controlled  - Hold home losartan and HCTZ given ASHLEY  - Resume home Amlodipine pending blood pressure trends      Elevated troponin  - 12 lead EKG showed sinus tachycardia with occasional Premature ventricular complexes  - Currently denies chest pain and/or equivalent  - Initial troponin 0.031, will trend  - Tele monitoring      ASHLEY (acute kidney injury)  - Cr elevated to 2.2 (last known Cr was 1.1 in 2004)  - Hold home losartan and HCTZ for now  - Gentle IVFs  - Caution with nephrotoxic medications  - Monitor with BMP      Acute hypoxemic respiratory failure  - secondary to  COVID-19 viral infection in addition to bilateral pneumonia  - ABG showed Po2 of 58 on room air  - treat pneumonia  - continue supplemental oxygen to [place] >92%  - Will need home O2 eval prior to DC  - monitor      Sepsis  - Tachycardic and tachypneic  - Lactic acid 1.8  - Procalcitonin pending  - Likely secondary to COVID-19 viral infection in addition to bilateral pneumonia  - BC drawn and are pending  - Will hold off on IV fluid bolus for now as patient is hemodynamically stable  - Continue IV ABX  - Monitor      Bilateral pneumonia  - BC drawn and are pending  - will attempt to obtain sputum culture  - Given Vanc and Zosyn in the ER, will transition to Azithromycin and Rocephin per protocol  - Albuterol inhaler PRN  - Supplemental O2 to KS >92  - Monitor         VTE Risk Mitigation (From admission, onward)         Ordered     heparin (porcine) injection 5,000 Units  Every 8 hours      07/10/20 1453     IP VTE HIGH RISK PATIENT  Once      07/10/20 1453     Place sequential compression device  Until discontinued      07/10/20 1453                      Tomer Velez MD  Department of Hospital Medicine   [facility]

## 2020-07-15 NOTE — NURSING
"Jacqueline NP replied "Order STAT H&H. Keep NPO and consult GI in AM." Orders placed. Pt made aware; verbalized understanding.  "

## 2020-07-15 NOTE — SUBJECTIVE & OBJECTIVE
Interval History:  No acute problems overnight. Desaturation and increased shortness of breath with ambulation. Boarding normally and tolerating regular meals. Unable to lay prone due to discomfort. Respiratory stable on vapor therm 25 L per minute. Oxygen saturation 94% at rest. Chief complaint is severe persistent nonproductive cough.    Review of Systems   Constitutional: Negative for chills and fever.   HENT: Negative for congestion and sore throat.    Eyes: Negative for visual disturbance.   Respiratory: Positive for cough and shortness of breath. Negative for wheezing.    Cardiovascular: Negative for chest pain, palpitations and leg swelling.   Gastrointestinal: Negative for abdominal pain, blood in stool, constipation, diarrhea, nausea and vomiting.   Genitourinary: Negative for dysuria and hematuria.   Musculoskeletal: Negative for arthralgias and back pain.   Skin: Negative for rash and wound.   Neurological: Negative for dizziness, weakness, light-headedness and numbness.   Hematological: Negative for adenopathy.     Objective:     Vital Signs (Most Recent):  Temp: 97.8 °F (36.6 °C) (07/14/20 1938)  Pulse: 78 (07/14/20 1938)  Resp: (!) 24 (07/14/20 2022)  BP: 130/80 (07/14/20 1938)  SpO2: 95 % (07/14/20 2048) Vital Signs (24h Range):  Temp:  [97 °F (36.1 °C)-98.5 °F (36.9 °C)] 97.8 °F (36.6 °C)  Pulse:  [73-88] 78  Resp:  [16-24] 24  SpO2:  [88 %-98 %] 95 %  BP: (123-149)/(63-80) 130/80     Weight: 126.3 kg (278 lb 7.1 oz)  Body mass index is 46.34 kg/m².    Intake/Output Summary (Last 24 hours) at 7/14/2020 2055  Last data filed at 7/14/2020 1709  Gross per 24 hour   Intake 4670 ml   Output 300 ml   Net 4370 ml      Physical Exam  Vitals signs and nursing note reviewed.   Constitutional:       General: She is not in acute distress.     Appearance: She is well-developed.   HENT:      Head: Normocephalic and atraumatic.   Eyes:      Conjunctiva/sclera: Conjunctivae normal.      Pupils: Pupils are equal,  round, and reactive to light.   Neck:      Musculoskeletal: Neck supple.      Thyroid: No thyromegaly.      Vascular: No JVD.   Cardiovascular:      Rate and Rhythm: Normal rate and regular rhythm.      Heart sounds: No murmur. No friction rub. No gallop.    Pulmonary:      Effort: Pulmonary effort is normal.      Breath sounds: Normal breath sounds. No wheezing or rales.   Abdominal:      General: Bowel sounds are normal. There is no distension.      Palpations: Abdomen is soft.      Tenderness: There is no abdominal tenderness. There is no guarding or rebound.   Musculoskeletal: Normal range of motion.         General: No deformity.   Lymphadenopathy:      Cervical: No cervical adenopathy.   Skin:     General: Skin is warm and dry.      Findings: No rash.   Neurological:      Mental Status: She is alert and oriented to person, place, and time.      Deep Tendon Reflexes: Reflexes are normal and symmetric.   Psychiatric:         Behavior: Behavior normal.         Thought Content: Thought content normal.         Judgment: Judgment normal.         Significant Labs: All pertinent labs within the past 24 hours have been reviewed.    Significant Imaging: I have reviewed all pertinent imaging results/findings within the past 24 hours.

## 2020-07-15 NOTE — NURSING
Pt stated she felt like she had to have another BM & only blood came out. Small amount of blood noted in BSC. Secure chat sent to On-call provider. Awaiting response.

## 2020-07-15 NOTE — PLAN OF CARE
No acute changes during shift. Vital signs remained stable. Patient having trouble eating due to frequent coughing, PRN meds given. Patient on tele monitor. All needs met safety measures in place. Will continue to monitor.

## 2020-07-16 ENCOUNTER — NURSE TRIAGE (OUTPATIENT)
Dept: ADMINISTRATIVE | Facility: CLINIC | Age: 55
End: 2020-07-16

## 2020-07-16 VITALS
WEIGHT: 288.13 LBS | OXYGEN SATURATION: 93 % | RESPIRATION RATE: 20 BRPM | HEIGHT: 65 IN | DIASTOLIC BLOOD PRESSURE: 87 MMHG | HEART RATE: 74 BPM | SYSTOLIC BLOOD PRESSURE: 151 MMHG | TEMPERATURE: 96 F | BODY MASS INDEX: 48 KG/M2

## 2020-07-16 LAB
ALBUMIN SERPL BCP-MCNC: 2.9 G/DL (ref 3.5–5.2)
ALP SERPL-CCNC: 81 U/L (ref 55–135)
ALT SERPL W/O P-5'-P-CCNC: 118 U/L (ref 10–44)
ANION GAP SERPL CALC-SCNC: 11 MMOL/L (ref 8–16)
AST SERPL-CCNC: 47 U/L (ref 10–40)
BILIRUB SERPL-MCNC: 0.3 MG/DL (ref 0.1–1)
BUN SERPL-MCNC: 18 MG/DL (ref 6–20)
CALCIUM SERPL-MCNC: 9 MG/DL (ref 8.7–10.5)
CHLORIDE SERPL-SCNC: 112 MMOL/L (ref 95–110)
CO2 SERPL-SCNC: 19 MMOL/L (ref 23–29)
CREAT SERPL-MCNC: 1.1 MG/DL (ref 0.5–1.4)
CRP SERPL-MCNC: 22.4 MG/L (ref 0–8.2)
EST. GFR  (AFRICAN AMERICAN): >60 ML/MIN/1.73 M^2
EST. GFR  (NON AFRICAN AMERICAN): 56 ML/MIN/1.73 M^2
GLUCOSE SERPL-MCNC: 103 MG/DL (ref 70–110)
MAGNESIUM SERPL-MCNC: 1.7 MG/DL (ref 1.6–2.6)
PHOSPHATE SERPL-MCNC: 3.4 MG/DL (ref 2.7–4.5)
POTASSIUM SERPL-SCNC: 3.7 MMOL/L (ref 3.5–5.1)
PROT SERPL-MCNC: 6 G/DL (ref 6–8.4)
SODIUM SERPL-SCNC: 142 MMOL/L (ref 136–145)

## 2020-07-16 PROCEDURE — 99900035 HC TECH TIME PER 15 MIN (STAT)

## 2020-07-16 PROCEDURE — 25000003 PHARM REV CODE 250: Performed by: NURSE PRACTITIONER

## 2020-07-16 PROCEDURE — 63600175 PHARM REV CODE 636 W HCPCS: Performed by: NURSE PRACTITIONER

## 2020-07-16 PROCEDURE — 94760 N-INVAS EAR/PLS OXIMETRY 1: CPT

## 2020-07-16 PROCEDURE — 25000003 PHARM REV CODE 250: Performed by: INTERNAL MEDICINE

## 2020-07-16 PROCEDURE — 27100171 HC OXYGEN HIGH FLOW UP TO 24 HOURS

## 2020-07-16 PROCEDURE — 94761 N-INVAS EAR/PLS OXIMETRY MLT: CPT

## 2020-07-16 PROCEDURE — 94799 UNLISTED PULMONARY SVC/PX: CPT

## 2020-07-16 PROCEDURE — 94640 AIRWAY INHALATION TREATMENT: CPT

## 2020-07-16 RX ORDER — AMLODIPINE BESYLATE 5 MG/1
10 TABLET ORAL DAILY
Start: 2020-07-16

## 2020-07-16 RX ADMIN — BENZONATATE 100 MG: 100 CAPSULE ORAL at 01:07

## 2020-07-16 RX ADMIN — SODIUM CHLORIDE 100 MG: 9 INJECTION, SOLUTION INTRAVENOUS at 01:07

## 2020-07-16 RX ADMIN — PROMETHAZINE HYDROCHLORIDE AND CODEINE PHOSPHATE 5 ML: 6.25; 1 SOLUTION ORAL at 11:07

## 2020-07-16 RX ADMIN — DEXAMETHASONE 6 MG: 4 TABLET ORAL at 09:07

## 2020-07-16 RX ADMIN — PROMETHAZINE HYDROCHLORIDE AND CODEINE PHOSPHATE 5 ML: 6.25; 1 SOLUTION ORAL at 01:07

## 2020-07-16 RX ADMIN — ALBUTEROL SULFATE 2 PUFF: 90 AEROSOL, METERED RESPIRATORY (INHALATION) at 12:07

## 2020-07-16 RX ADMIN — LEVOTHYROXINE SODIUM 75 MCG: 75 TABLET ORAL at 05:07

## 2020-07-16 RX ADMIN — ALBUTEROL SULFATE 2 PUFF: 90 AEROSOL, METERED RESPIRATORY (INHALATION) at 08:07

## 2020-07-16 RX ADMIN — DIPHENHYDRAMINE HYDROCHLORIDE 25 MG: 25 CAPSULE ORAL at 02:07

## 2020-07-16 RX ADMIN — SODIUM CHLORIDE: 0.9 INJECTION, SOLUTION INTRAVENOUS at 09:07

## 2020-07-16 RX ADMIN — PANTOPRAZOLE SODIUM 40 MG: 40 TABLET, DELAYED RELEASE ORAL at 09:07

## 2020-07-16 RX ADMIN — BENZONATATE 100 MG: 100 CAPSULE ORAL at 11:07

## 2020-07-16 NOTE — PLAN OF CARE
Discharge orders noted faxed to [facility], Contacted Patricia in Admissions they have received paperwork.      Disposition: [facility]    # for report: [phone]    Transportation:      Update to sarah Baker nurse...         07/16/20 1219   Post-Acute Status   Post-Acute Authorization Placement   Post-Acute Placement Status Set-up Complete   Discharge Delays (!) Other

## 2020-07-16 NOTE — TELEPHONE ENCOUNTER
COVID Home Surveillance    Patient had orders placed for Home Surveillance Program. Patient was discharged today to [facility] and therefore does not meet the requirements to remain in the program.No outreach at this time.     Reason for Disposition   Caller has cancelled the call before the first contact    Additional Information   Negative: Caller has already spoken with the PCP (or office), and has no further questions   Negative: Caller has already spoken with another triager and has no further questions   Negative: Caller has already spoken with another triager or PCP (or office), and has further questions and triager able to answer questions.   Negative: Busy signal.  First attempt to contact caller.  Follow-up call scheduled within 15 minutes.   Negative: No answer.  First attempt to contact caller.  Follow-up call scheduled within 15 minutes.   Negative: Message left on identified voicemail   Negative: Message left on unidentified voice mail. Phone number verified.   Negative: Message left with person in household   Negative: Wrong number reached. Phone number verified.   Negative: Second attempt to contact family AND no contact made. Phone number verified.   Negative: Cell phone out of range. Phone number verified.   Negative: Patient already left for the hospital/clinic    Protocols used: NO CONTACT OR DUPLICATE CONTACT CALL-A-[place]

## 2020-07-16 NOTE — PLAN OF CARE
Call received from Silver Rivas at [facility]. They have received insurance auth from [employer]. Secure message sent to provider requesting discharge orders if appropriate.       07/16/20 1112   Post-Acute Status   Post-Acute Authorization Placement   Post-Acute Placement Status Authorization Obtained   Discharge Delays (!) Other

## 2020-07-16 NOTE — PROGRESS NOTES
[facility] Hospital Medicine  Progress Note    Patient Name: Barbara Weir  MRN: [id number]  Patient Class: IP- Inpatient   Admission Date: 7/10/2020  Length of Stay: 5 days  Attending Physician: Tomer Velez MD  Primary Care Provider: Daniella Samson MD        Subjective:     Principal Problem:COVID-19 virus infection        HPI:  Barbara Weir is a 55 y.o. AAF with a PMHx of Morbid obesity, Anemia, and HTN, who presented to the Emergency Department today for evaluation of worsening SOB, which onset a few days ago.  Symptoms are constant and moderate in severity.  Of note, patient reportedly tested positive for COVID-19 on 7/7/20 (test obtained at [facility]), but repeat rapid test today noted to be negative.  Patient's  also tested positive and was admitted today.  No mitigating or exacerbating factors reported.  Associated sxs include SOB, myalgias, cough, nausea, subjective fever, chills, and fatigue. Patient denies any CP, vomiting, diarrhea, dysuria, HA, and all other sxs at this time.  No prior Tx reported.  No further complaints or concerns at this time.  ER workup showed; tachycardia and tachypnea.  SaO2 on nasal cannula 93%.  CBC showed WBCs of 3.59, otherwise unremarkable.  CMP showed creatinine 2.2, CO2 19, AST 43, otherwise unremarkable.  BNP less than 10.  .  Lactic acid 1.8.  ABG showed; ph 7.447, Pco2 21.2, HCO3 14.6, Po2 58 on RA.  CXR showed a moderate amount of alveolar consolidation scattered throughout the lower 2/3 of both lungs.  This is consistent with the patient's history and characteristic of pneumonia.  The size of the heart is prominent.  Some of this may be secondary to magnification.  BC were drawn and patient was given Vanc and Zosyn in the ER.  Hospital Medicine contacted for admission.  Patient will be placed in observation.  She is a full code.  Her SDM is her daughter Charley who can be reached at [phone].    Overview/Hospital  Course:  No notes on file    Interval History:  No acute problems overnight. Desaturation and increased shortness of breath with ambulation. Holding steady  on vaportherm 25 L per minute. Oxygen saturation 95% at rest. Chief complaint is severe persistent nonproductive cough.    Review of Systems   Constitutional: Negative for chills and fever.   HENT: Negative for congestion and sore throat.    Eyes: Negative for visual disturbance.   Respiratory: Positive for cough and shortness of breath. Negative for wheezing.    Cardiovascular: Negative for chest pain, palpitations and leg swelling.   Gastrointestinal: Negative for abdominal pain, blood in stool, constipation, diarrhea, nausea and vomiting.   Genitourinary: Negative for dysuria and hematuria.   Musculoskeletal: Negative for arthralgias and back pain.   Skin: Negative for rash and wound.   Neurological: Negative for dizziness, weakness, light-headedness and numbness.   Hematological: Negative for adenopathy.     Objective:     Vital Signs (Most Recent):  Temp: 98.3 °F (36.8 °C) (07/15/20 1916)  Pulse: 93 (07/15/20 2123)  Resp: (!) 24 (07/15/20 1916)  BP: 125/81 (07/15/20 1916)  SpO2: (!) 93 % (07/15/20 2021) Vital Signs (24h Range):  Temp:  [97.8 °F (36.6 °C)-98.4 °F (36.9 °C)] 98.3 °F (36.8 °C)  Pulse:  [] 93  Resp:  [18-24] 24  SpO2:  [92 %-100 %] 93 %  BP: (125-157)/(63-85) 125/81     Weight: 130.9 kg (288 lb 9.3 oz)  Body mass index is 48.02 kg/m².    Intake/Output Summary (Last 24 hours) at 7/15/2020 2152  Last data filed at 7/15/2020 0400  Gross per 24 hour   Intake 2186 ml   Output --   Net 2186 ml      Physical Exam  Vitals signs and nursing note reviewed.   Constitutional:       General: She is not in acute distress.     Appearance: She is well-developed.   HENT:      Head: Normocephalic and atraumatic.   Eyes:      Conjunctiva/sclera: Conjunctivae normal.      Pupils: Pupils are equal, round, and reactive to light.   Neck:      Musculoskeletal:  Neck supple.      Thyroid: No thyromegaly.      Vascular: No JVD.   Cardiovascular:      Rate and Rhythm: Normal rate and regular rhythm.      Heart sounds: No murmur. No friction rub. No gallop.    Pulmonary:      Effort: Pulmonary effort is normal.      Breath sounds: Normal breath sounds. No wheezing or rales.   Abdominal:      General: Bowel sounds are normal. There is no distension.      Palpations: Abdomen is soft.      Tenderness: There is no abdominal tenderness. There is no guarding or rebound.   Musculoskeletal: Normal range of motion.         General: No deformity.   Lymphadenopathy:      Cervical: No cervical adenopathy.   Skin:     General: Skin is warm and dry.      Findings: No rash.   Neurological:      Mental Status: She is alert and oriented to person, place, and time.      Deep Tendon Reflexes: Reflexes are normal and symmetric.   Psychiatric:         Behavior: Behavior normal.         Thought Content: Thought content normal.         Judgment: Judgment normal.         Significant Labs: All pertinent labs within the past 24 hours have been reviewed.    Significant Imaging: I have reviewed all pertinent imaging results/findings within the past 24 hours.      Assessment/Plan:      * COVID-19 virus infection   - COVID-19 testing performed on 7/7 at [facility] and reported as POSITIVE; rapid today is negative, however, clinically, patient presents with Covid  - Infection Control notified   - Will start on Dexamethasone 6 mg po daily  - BC drawn and are pending.  Continue empiric antibiotics  - Inflammatory markers, including D dimer pending  Initiated Remdesivir treatment on 12 July.    Remdesivir FDA EUA Verbal Consent  The patient or parent/caregiver has been provided with the remdesivir Fact Sheet for Patients and Parents/Caregivers and has been counseled that the FDA has authorized the emergency use of remdesivir, which is not an FDA approved drug. The significant known and potential risks and benefits  are unknown. The patient or parent/caregiver has been given the option to accept or refuse and has verbally agreed to receive remdesivir. Daily labs will be ordered and monitoring for Serious Adverse Events will be performed.    - Isolation:   - Airborne, Contact and Droplet Precautions  - Cohort patients into COVID units  - N95 mask, wear eye protection  - 20 second hand hygiene              - Limit visitors per hospital policy              - Consolidating lab draws, nursing care, provider visits, and interventions    - Diagnostics: (leukopenia, hyponatremia, hyperferritinemia, elevated troponin, elevated d-dimer, age, and comorbidities are significant predictors of poor clinical outcome)  CBC, CMP, Procalcitonin, Ferritin, CRP, LDH, Troponin, ECG, Portable CXR and UA and culture    - Management:  Supplemental O2 to maintain SpO2 >92%  Telemetry  Continuous/intermittent Pulse Ox  Albuterol treatment PRN    Advance Care Planning   Full Code             GERD (gastroesophageal reflux disease)  - Continue Protonix      Hypothyroidism  - TSH pending  - continue home Synthroid and adjust dose if need be  - outpatient follow-up with PCP      Morbid obesity  -  on self-directed weight loss and need for increased physical activity      HTN (hypertension)  - BP well controlled  - Hold home losartan and HCTZ given ASHLEY  - Resume home Amlodipine pending blood pressure trends      Elevated troponin  - 12 lead EKG showed sinus tachycardia with occasional Premature ventricular complexes  - Currently denies chest pain and/or equivalent  - Initial troponin 0.031, will trend  - Tele monitoring      ASHLEY (acute kidney injury)  - Cr elevated to 2.2 (last known Cr was 1.1 in 2004)  - Hold home losartan and HCTZ for now  - Gentle IVFs  - Caution with nephrotoxic medications  - Monitor with BMP      Acute hypoxemic respiratory failure  - secondary to COVID-19 viral infection in addition to bilateral pneumonia  - ABG showed Po2 of 58  on room air  - treat pneumonia  - continue supplemental oxygen to KS >92%  - Will need home O2 eval prior to DC  - monitor      Sepsis  - Tachycardic and tachypneic  - Lactic acid 1.8  - Procalcitonin pending  - Likely secondary to COVID-19 viral infection in addition to bilateral pneumonia  - BC drawn and are pending  - Will hold off on IV fluid bolus for now as patient is hemodynamically stable  - Continue IV ABX  - Monitor      Bilateral pneumonia  - BC drawn and are pending  - will attempt to obtain sputum culture  - Given Vanc and Zosyn in the ER, will transition to Azithromycin and Rocephin per protocol  - Albuterol inhaler PRN  - Supplemental O2 to KS >92  - Monitor         VTE Risk Mitigation (From admission, onward)         Ordered     IP VTE HIGH RISK PATIENT  Once      07/10/20 1453     Place sequential compression device  Until discontinued      07/10/20 1453                      Tomer Velez MD  Department of Hospital Medicine   [facility]

## 2020-07-16 NOTE — ASSESSMENT & PLAN NOTE
- COVID-19 testing performed on 7/7 at [facility] and reported as POSITIVE; rapid today is negative, however, clinically, patient presents with Covid  - Infection Control notified   - Will start on Dexamethasone 6 mg po daily  - BC drawn and are pending.  Continue empiric antibiotics  - Inflammatory markers, including D dimer pending  Initiated Remdesivir treatment on 12 July.    Remdesivir FDA EUA Verbal Consent  The patient or parent/caregiver has been provided with the remdesivir Fact Sheet for Patients and Parents/Caregivers and has been counseled that the FDA has authorized the emergency use of remdesivir, which is not an FDA approved drug. The significant known and potential risks and benefits are unknown. The patient or parent/caregiver has been given the option to accept or refuse and has verbally agreed to receive remdesivir. Daily labs will be ordered and monitoring for Serious Adverse Events will be performed.    - Isolation:   - Airborne, Contact and Droplet Precautions  - Cohort patients into COVID units  - N95 mask, wear eye protection  - 20 second hand hygiene              - Limit visitors per hospital policy              - Consolidating lab draws, nursing care, provider visits, and interventions    - Diagnostics: (leukopenia, hyponatremia, hyperferritinemia, elevated troponin, elevated d-dimer, age, and comorbidities are significant predictors of poor clinical outcome)  CBC, CMP, Procalcitonin, Ferritin, CRP, LDH, Troponin, ECG, Portable CXR and UA and culture    - Management:  Supplemental O2 to maintain SpO2 >92%  Telemetry  Continuous/intermittent Pulse Ox  Albuterol treatment PRN    Advance Care Planning   Full Code

## 2020-07-16 NOTE — SUBJECTIVE & OBJECTIVE
Interval History:  No acute problems overnight. Desaturation and increased shortness of breath with ambulation. Holding steady  on vaportherm 25 L per minute. Oxygen saturation 95% at rest. Chief complaint is severe persistent nonproductive cough.    Review of Systems   Constitutional: Negative for chills and fever.   HENT: Negative for congestion and sore throat.    Eyes: Negative for visual disturbance.   Respiratory: Positive for cough and shortness of breath. Negative for wheezing.    Cardiovascular: Negative for chest pain, palpitations and leg swelling.   Gastrointestinal: Negative for abdominal pain, blood in stool, constipation, diarrhea, nausea and vomiting.   Genitourinary: Negative for dysuria and hematuria.   Musculoskeletal: Negative for arthralgias and back pain.   Skin: Negative for rash and wound.   Neurological: Negative for dizziness, weakness, light-headedness and numbness.   Hematological: Negative for adenopathy.     Objective:     Vital Signs (Most Recent):  Temp: 98.3 °F (36.8 °C) (07/15/20 1916)  Pulse: 93 (07/15/20 2123)  Resp: (!) 24 (07/15/20 1916)  BP: 125/81 (07/15/20 1916)  SpO2: (!) 93 % (07/15/20 2021) Vital Signs (24h Range):  Temp:  [97.8 °F (36.6 °C)-98.4 °F (36.9 °C)] 98.3 °F (36.8 °C)  Pulse:  [] 93  Resp:  [18-24] 24  SpO2:  [92 %-100 %] 93 %  BP: (125-157)/(63-85) 125/81     Weight: 130.9 kg (288 lb 9.3 oz)  Body mass index is 48.02 kg/m².    Intake/Output Summary (Last 24 hours) at 7/15/2020 2152  Last data filed at 7/15/2020 0400  Gross per 24 hour   Intake 2186 ml   Output --   Net 2186 ml      Physical Exam  Vitals signs and nursing note reviewed.   Constitutional:       General: She is not in acute distress.     Appearance: She is well-developed.   HENT:      Head: Normocephalic and atraumatic.   Eyes:      Conjunctiva/sclera: Conjunctivae normal.      Pupils: Pupils are equal, round, and reactive to light.   Neck:      Musculoskeletal: Neck supple.      Thyroid: No  thyromegaly.      Vascular: No JVD.   Cardiovascular:      Rate and Rhythm: Normal rate and regular rhythm.      Heart sounds: No murmur. No friction rub. No gallop.    Pulmonary:      Effort: Pulmonary effort is normal.      Breath sounds: Normal breath sounds. No wheezing or rales.   Abdominal:      General: Bowel sounds are normal. There is no distension.      Palpations: Abdomen is soft.      Tenderness: There is no abdominal tenderness. There is no guarding or rebound.   Musculoskeletal: Normal range of motion.         General: No deformity.   Lymphadenopathy:      Cervical: No cervical adenopathy.   Skin:     General: Skin is warm and dry.      Findings: No rash.   Neurological:      Mental Status: She is alert and oriented to person, place, and time.      Deep Tendon Reflexes: Reflexes are normal and symmetric.   Psychiatric:         Behavior: Behavior normal.         Thought Content: Thought content normal.         Judgment: Judgment normal.         Significant Labs: All pertinent labs within the past 24 hours have been reviewed.    Significant Imaging: I have reviewed all pertinent imaging results/findings within the past 24 hours.

## 2020-07-16 NOTE — NURSING
Pt discharged to [facility] via [facility].  Tele monitor returned to monitor room.  Remdesivir dose completed prior to transfer.  PIV remains intact per [facility] request.  Copy of discharge instructions given to transportation for [facility].  All questions answered

## 2020-07-17 NOTE — PLAN OF CARE
07/17/20 1223   Final Note   Assessment Type Final Discharge Note   Anticipated Discharge Disposition LTAC   Right Care Referral Info   Post Acute Recommendation Other   Referral Type LTAC   Facility Name [facility]

## 2020-07-17 NOTE — HOSPITAL COURSE
Admitted for evaluation and treatment of acute hypoxemia respiratory failure. Workup revealed COVID 19 virus infection.  Supplemental oxygen by high flow nasal cannula. Initiated courts of dexamethasone and remdesivir in accordance with the recommendations of infectious disease specialty.  Variable course in respiratory symptoms requiring a modest degree of support. She held steady at between 15 and 25 L per minute high flow. Her primary complaint was cough. The cough would worsen with shortness of breath on ambulation. Cough was nonproductive. She denied any chest pain. She did have a few episodes of diarrhea but no other G.I. symptoms. Discharge plan was to transfer to [facility] for continued treatment of viral pneumonia and respiratory support waning.

## 2020-07-17 NOTE — DISCHARGE SUMMARY
[facility] Hospital Medicine  Discharge Summary      Patient Name: Barbara Weir  MRN: [id number]  Admission Date: 7/10/2020  Hospital Length of Stay: 6 days  Discharge Date and Time: 7/16/2020  3:43 PM  Attending Physician:  Tomer Velez MD  Discharging Provider: Tomer Velez MD  Primary Care Provider: Daniella Samson MD      HPI:   Barbara Weir is a 55 y.o. AAF with a PMHx of Morbid obesity, Anemia, and HTN, who presented to the Emergency Department today for evaluation of worsening SOB, which onset a few days ago.  Symptoms are constant and moderate in severity.  Of note, patient reportedly tested positive for COVID-19 on 7/7/20 (test obtained at [facility]), but repeat rapid test today noted to be negative.  Patient's  also tested positive and was admitted today.  No mitigating or exacerbating factors reported.  Associated sxs include SOB, myalgias, cough, nausea, subjective fever, chills, and fatigue. Patient denies any CP, vomiting, diarrhea, dysuria, HA, and all other sxs at this time.  No prior Tx reported.  No further complaints or concerns at this time.  ER workup showed; tachycardia and tachypnea.  SaO2 on nasal cannula 93%.  CBC showed WBCs of 3.59, otherwise unremarkable.  CMP showed creatinine 2.2, CO2 19, AST 43, otherwise unremarkable.  BNP less than 10.  .  Lactic acid 1.8.  ABG showed; ph 7.447, Pco2 21.2, HCO3 14.6, Po2 58 on RA.  CXR showed a moderate amount of alveolar consolidation scattered throughout the lower 2/3 of both lungs.  This is consistent with the patient's history and characteristic of pneumonia.  The size of the heart is prominent.  Some of this may be secondary to magnification.  BC were drawn and patient was given Vanc and Zosyn in the ER.  Hospital Medicine contacted for admission.  Patient will be placed in observation.  She is a full code.  Her SDM is her daughter Charley who can be reached at [phone].    * No surgery  found *      Hospital Course:   Admitted for evaluation and treatment of acute hypoxemia respiratory failure. Workup revealed COVID 19 virus infection.  Supplemental oxygen by high flow nasal cannula. Initiated courts of dexamethasone and remdesivir in accordance with the recommendations of infectious disease specialty.  Variable course in respiratory symptoms requiring a modest degree of support. She held steady at between 15 and 25 L per minute high flow. Her primary complaint was cough. The cough would worsen with shortness of breath on ambulation. Cough was nonproductive. She denied any chest pain. She did have a few episodes of diarrhea but no other G.I. symptoms. Discharge plan was to transfer to [facility] for continued treatment of viral pneumonia and respiratory support waning.     Consults:   Consults (From admission, onward)        Status Ordering Provider     Inpatient consult to Social Work  Once     Provider:  (Not yet assigned)    BASSAM Augustin          No new Assessment & Plan notes have been filed under this hospital service since the last note was generated.  Service: Hospital Medicine    Final Active Diagnoses:    Diagnosis Date Noted POA    PRINCIPAL PROBLEM:  COVID-19 virus infection [U07.1] 07/10/2020 Yes    Bilateral pneumonia [J18.9] 07/10/2020 Unknown    Sepsis [A41.9] 07/10/2020 Unknown    Acute hypoxemic respiratory failure [J96.01] 07/10/2020 Unknown    ASHLEY (acute kidney injury) [N17.9] 07/10/2020 Unknown    Elevated troponin [R79.89] 07/10/2020 Unknown    HTN (hypertension) [I10] 07/10/2020 Unknown    Morbid obesity [E66.01] 07/10/2020 Unknown    Hypothyroidism [E03.9] 07/10/2020 Unknown    GERD (gastroesophageal reflux disease) [K21.9] 07/10/2020 Unknown      Problems Resolved During this Admission:       Discharged Condition: stable    Disposition: Long Term Care    Follow Up:  Follow-up Information     [facility].    Why: LTAC  Contact  information:  [address] [facility] [address]  [phone]               Patient Instructions:      Diet Cardiac     COVID-19 Surveillance Program     Order Specific Question Answer Comments   Does patient have a smartphone? Yes    Does patient have the [facility] on their smartphone? Yes      Activity as tolerated       Significant Diagnostic Studies: Labs: All labs within the past 24 hours have been reviewed    Pending Diagnostic Studies:     None         Medications:  Reconciled Home Medications:      Medication List      START taking these medications    pulse oximeter device  Commonly known as: pulse oximeter  by Apply Externally route 2 (two) times a day. Use twice daily at 8 AM and 3 PM and record the value in [employer] as directed.        CHANGE how you take these medications    amLODIPine 5 MG tablet  Commonly known as: NORVASC  Take 2 tablets (10 mg total) by mouth once daily.  What changed: how much to take        CONTINUE taking these medications    aspirin 81 MG EC tablet  Commonly known as: ECOTRIN  Take 81 mg by mouth once daily.     biotin 10 mg Tab  1 tablet     FERGON 240 (27 FE) MG tablet  Generic drug: ferrous gluconate  Take 480 mg by mouth 3 (three) times daily.     levothyroxine 75 MCG tablet  Commonly known as: SYNTHROID     losartan 100 MG tablet  Commonly known as: COZAAR  Take 100 mg by mouth once daily.     melatonin 300 mcg Tab  1 tablet at bedtime as needed with food     pantoprazole 40 MG tablet  Commonly known as: PROTONIX  Take 40 mg by mouth once daily.     potassium chloride SA 20 MEQ tablet  Commonly known as: K-DUR,KLOR-CON        STOP taking these medications    CALCIUM MAGNESIUM 500 mg calcium -250 mg Tab  Generic drug: Ca carb-Ca gluc-Mg ox-Mg gluco     naproxen 375 MG tablet  Commonly known as: NAPROSYN     omega 3-dha-epa-fish oil 1,000 mg (120 mg-180 mg) Cap     triamterene-hydrochlorothiazide 37.5-25 mg 37.5-25 mg per capsule  Commonly known as:  DYAZIDE            Indwelling Lines/Drains at time of discharge:   Lines/Drains/Airways     None                 Time spent on the discharge of patient: 30 minutes  Patient was seen and examined on the date of discharge and determined to be suitable for discharge.         Tomer Velez MD  Department of Hospital Medicine  [facility]